# Patient Record
Sex: MALE | Race: WHITE | NOT HISPANIC OR LATINO | Employment: FULL TIME | URBAN - METROPOLITAN AREA
[De-identification: names, ages, dates, MRNs, and addresses within clinical notes are randomized per-mention and may not be internally consistent; named-entity substitution may affect disease eponyms.]

---

## 2017-08-28 ENCOUNTER — OFFICE VISIT (OUTPATIENT)
Dept: FAMILY MEDICINE | Facility: CLINIC | Age: 42
End: 2017-08-28
Payer: COMMERCIAL

## 2017-08-28 ENCOUNTER — RADIANT APPOINTMENT (OUTPATIENT)
Dept: GENERAL RADIOLOGY | Facility: CLINIC | Age: 42
End: 2017-08-28
Attending: PHYSICIAN ASSISTANT
Payer: COMMERCIAL

## 2017-08-28 VITALS
BODY MASS INDEX: 23.93 KG/M2 | HEART RATE: 58 BPM | WEIGHT: 146 LBS | DIASTOLIC BLOOD PRESSURE: 77 MMHG | SYSTOLIC BLOOD PRESSURE: 130 MMHG

## 2017-08-28 DIAGNOSIS — M25.512 CHRONIC LEFT SHOULDER PAIN: ICD-10-CM

## 2017-08-28 DIAGNOSIS — G89.29 CHRONIC LEFT SHOULDER PAIN: ICD-10-CM

## 2017-08-28 DIAGNOSIS — M25.512 CHRONIC LEFT SHOULDER PAIN: Primary | ICD-10-CM

## 2017-08-28 DIAGNOSIS — G89.29 CHRONIC LEFT SHOULDER PAIN: Primary | ICD-10-CM

## 2017-08-28 PROCEDURE — 73030 X-RAY EXAM OF SHOULDER: CPT | Mod: LT

## 2017-08-28 PROCEDURE — 99213 OFFICE O/P EST LOW 20 MIN: CPT | Performed by: PHYSICIAN ASSISTANT

## 2017-08-28 NOTE — PROGRESS NOTES
SUBJECTIVE:   Shamir Shea is a 42 year old male who presents to clinic today for the following health issues:    Chief Complaint   Patient presents with     Shoulder Pain     left shoulder pain x4 months. He recalls the pain started after throwing baseballs with his sons baseball team, Limited range of motion due to pain. no history of injury.      Left Shoulder Pain    Onset: 4 months    Description:   Location: outside of left shoulder  Character: Dull ache    Intensity: moderate    Accompanying Signs & Symptoms:  Other symptoms: pain when he lays on his side    History:   Previous similar pain: no       Precipitating factors:   Trauma or overuse: YES- throwing baseball    Alleviating factors:  Improved slightly by: rest/inactivity    Therapies Tried and outcome: tylenol ice and heat    Works in IT, typicng doesn't bother  Right handed  Sleeping/exercising hurts, arms over head. Adducting hurts  He was catching a baseball 4 months ago, remembers feeling something and it being sore. Caught up and off to the side  Has cut back on working out      Problem list and histories reviewed & adjusted, as indicated.  Additional history: as documented    Reviewed and updated as needed this visit by clinical staffTobacco  Allergies  Meds  Problems       Reviewed and updated as needed this visit by Provider  Allergies  Meds  Problems         ROS:  Other than noted above, general, HEENT, respiratory, cardiac, MS, and gastrointestinal systems are negative.     OBJECTIVE:     /77 (BP Location: Right arm, Patient Position: Chair, Cuff Size: Adult Regular)  Pulse 58  Wt 146 lb (66.2 kg)  BMI 23.93 kg/m2  Body mass index is 23.93 kg/(m^2).  GENERAL: healthy, alert and no distress  RESP: lungs clear to auscultation - no rales, rhonchi or wheezes  CV: regular rate and rhythm, normal S1 S2, no S3 or S4, no murmur, click or rub, no peripheral edema and peripheral pulses strong  ORTHO:   SHOULDER Exam-Left    Inspection: no swelling, no bruising, no discoloration, no obvious deformity, no asymmetry, no glenohumeral joint anterior bulge, no distal clavicle elevation, no muscle atrophy, no scapular winging   Tenderness of: Tender generalized joint space, lateral shoulder   Range of Motion: Active- limited forward flexion and abduction with pain, external rotation painful  Range of Motion: Passive- forward flexion- normal, abduction- normal, external rotation- normal, internal rotation- normal   Strength: forward flexion- weak, abduction- 5/5, internal rotation- 5/5, external rotation- 5/5 and bicep- full   Special tests: Neers- negative and Escalona(supraspinatous)- POSITIVE, lift off okay. External rotation painful        X-ray - IMPRESSION: Glenohumeral articulation appears to be intact on scapular Y radiograph. Distance between the distal clavicle and acromion appears increased at 8 mm, though clinically correlate with any possibility of AC joint injury with localized tenderness. No acute fracture visible.   I independently viewed the x-ray, discussed with patient, and am awaiting radiology read.     ASSESSMENT/PLAN:     ASSESSMENT/PLAN:      ICD-10-CM    1. Chronic left shoulder pain M25.512 XR Shoulder Left G/E 3 Views    G89.29 MR Shoulder Left w/o Contrast     He saw [facility] for other surgeries,he will wait on referral til MRI. He was frustrated with his knee with seeing physical therapy for a long time not helping til he found out he needed surgery.    Patient Instructions   MRI: For [place] imaging department: call [phone] to schedule   Possibly ortho or physical therapy after that    Ice your shoulder when sore.   Do hanging arm rolls - increase weight as you go  Stretch your shoulder by grabbing a broom handle with both hands spread apart. Elevate and stretch your sore shoulder by raising that arm with your other arm. Your sore arm is just hanging on to the handle and the handle does the stretching.  Walk your  arm up the wall to stretch it  Keep shoulder in one place and rotate elbow side to side    Kelsi Romero PA-C  [facility]

## 2017-08-28 NOTE — NURSING NOTE
"Chief Complaint   Patient presents with     Shoulder Pain     left shoulder pain x4 months. He recalls the pain started after throwing baseballs with his sons baseball team, Limited range of motion due to pain. no history of injury.        Initial /77 (BP Location: Right arm, Patient Position: Chair, Cuff Size: Adult Regular)  Pulse 58  Wt 146 lb (66.2 kg)  BMI 23.93 kg/m2 Estimated body mass index is 23.93 kg/(m^2) as calculated from the following:    Height as of 9/8/16: 5' 5.5\" (1.664 m).    Weight as of this encounter: 146 lb (66.2 kg).  Medication Reconciliation: complete   Екатерина Cox cMA    "

## 2017-08-28 NOTE — PATIENT INSTRUCTIONS
MRI: For [place] imaging department: call [phone] to schedule   Possibly ortho or physical therapy after that    Ice your shoulder when sore.   Do hanging arm rolls - increase weight as you go  Stretch your shoulder by grabbing a broom handle with both hands spread apart. Elevate and stretch your sore shoulder by raising that arm with your other arm. Your sore arm is just hanging on to the handle and the handle does the stretching.  Walk your arm up the wall to stretch it  Keep shoulder in one place and rotate elbow side to side

## 2017-08-28 NOTE — MR AVS SNAPSHOT
After Visit Summary   8/28/2017    Shamir Shea    MRN: [id number]           Patient Information     Date Of Birth          1975        Visit Information        Provider Department      8/28/2017 3:40 PM Kelsi Romero PA-C [facility]        Today's Diagnoses     Chronic left shoulder pain    -  1      Care Instructions    MRI: For [place] imaging department: call [phone] to schedule   Possibly ortho or physical therapy after that    Ice your shoulder when sore.   Do hanging arm rolls - increase weight as you go  Stretch your shoulder by grabbing a broom handle with both hands spread apart. Elevate and stretch your sore shoulder by raising that arm with your other arm. Your sore arm is just hanging on to the handle and the handle does the stretching.  Walk your arm up the wall to stretch it  Keep shoulder in one place and rotate elbow side to side              Follow-ups after your visit        Future tests that were ordered for you today     Open Future Orders        Priority Expected Expires Ordered    MR Shoulder Left w/o Contrast Routine  8/28/2018 8/28/2017            Who to contact     Normal or non-critical lab and imaging results will be communicated to you by Vitals ([email or web address])hart, letter or phone within 4 business days after the clinic has received the results. If you do not hear from us within 7 days, please contact the clinic through [facility] or phone. If you have a critical or abnormal lab result, we will notify you by phone as soon as possible.  Submit refill requests through QRcao or call your pharmacy and they will forward the refill request to us. Please allow 3 business days for your refill to be completed.          If you need to speak with a  for additional information , please call: [phone]             Additional Information About Your Visit        Vitals ([email or web address])harPrimÃ¢â‚¬â„¢Vision Information     QRcao gives you secure access to your electronic health record. If you see  a primary care provider, you can also send messages to your care team and make appointments. If you have questions, please call your primary care clinic.  If you do not have a primary care provider, please call [phone] and they will assist you.        Care EveryWhere ID     This is your Care EveryWhere ID. This could be used by other organizations to access your [place] medical records  [id number]        Your Vitals Were     Pulse BMI (Body Mass Index)                58 23.93 kg/m2           Blood Pressure from Last 3 Encounters:   08/28/17 130/77   09/08/16 128/84   11/14/11 119/67    Weight from Last 3 Encounters:   08/28/17 146 lb (66.2 kg)   09/08/16 148 lb (67.1 kg)   11/14/11 140 lb (63.5 kg)               Primary Care Provider Office Phone # Fax #    Humberto Schrader [phone]       [facility] PROGRAM [address] [address]        Equal Access to Services     LEVI MAHONEY AH: Hadii aad ku hadasho Soomaali, waaxda luqadaha, qaybta kaalmada adeegyada, waxay janetin haychristiano preciado . So [facility] [phone].    ATENCIÓN: Si habla español, tiene a cuadra disposición servicios gratuitos de asistencia lingüística. Llame al [phone].    We comply with applicable federal civil rights laws and [place] laws. We do not discriminate on the basis of race, color, national origin, age, disability sex, sexual orientation or gender identity.            Thank you!     Thank you for choosing [facility]  for your care. Our goal is always to provide you with excellent care. Hearing back from our patients is one way we can continue to improve our services. Please take a few minutes to complete the written survey that you may receive in the mail after your visit with us. Thank you!             Your Updated Medication List - Protect others around you: Learn how to safely use, store and throw away your medicines at www.disposemymeds.org.      Notice  As of 8/28/2017   4:26 PM    You have not been prescribed any medications.

## 2017-09-08 ENCOUNTER — HOSPITAL ENCOUNTER (OUTPATIENT)
Dept: MRI IMAGING | Facility: CLINIC | Age: 42
Discharge: HOME | End: 2017-09-08
Attending: PHYSICIAN ASSISTANT | Admitting: PHYSICIAN ASSISTANT
Payer: COMMERCIAL

## 2017-09-08 DIAGNOSIS — M25.512 CHRONIC LEFT SHOULDER PAIN: ICD-10-CM

## 2017-09-08 DIAGNOSIS — G89.29 CHRONIC LEFT SHOULDER PAIN: ICD-10-CM

## 2017-09-08 PROCEDURE — 73221 MRI JOINT UPR EXTREM W/O DYE: CPT | Mod: LT

## 2017-09-11 NOTE — ADDENDUM NOTE
Encounter addended by: Kelsi Romero PA-C on: 9/11/2017 11:58 AM<BR>     Actions taken:  activity accessed, Diagnosis association updated

## 2017-09-20 ENCOUNTER — OFFICE VISIT (OUTPATIENT)
Dept: ORTHOPEDICS | Facility: CLINIC | Age: 42
End: 2017-09-20
Payer: COMMERCIAL

## 2017-09-20 VITALS
SYSTOLIC BLOOD PRESSURE: 135 MMHG | WEIGHT: 147.9 LBS | HEART RATE: 65 BPM | BODY MASS INDEX: 23.77 KG/M2 | DIASTOLIC BLOOD PRESSURE: 91 MMHG | HEIGHT: 66 IN

## 2017-09-20 DIAGNOSIS — M75.82 TENDINITIS OF LEFT ROTATOR CUFF: Primary | ICD-10-CM

## 2017-09-20 PROCEDURE — 99203 OFFICE O/P NEW LOW 30 MIN: CPT | Performed by: PEDIATRICS

## 2017-09-20 NOTE — MR AVS SNAPSHOT
"              After Visit Summary   9/20/2017    Shamir Shea    MRN: [id number]           Patient Information     Date Of Birth          1975        Visit Information        Provider Department      9/20/2017 10:40 AM Jesenia Silver MD [facility] [place]        Today's Diagnoses     Tendinitis of left rotator cuff    -  1      Care Instructions    Today's Plan of Care:  Rehab: Physical Therapy: [facility] - [phone]    Follow Up: 6 weeks if symptoms fail to improve or worsen. Call with any questions or concerns.               Follow-ups after your visit        Additional Services     PHYSICAL THERAPY REFERRAL       *This therapy referral will be filtered to a centralized scheduling office at [facility] and the patient will receive a call to schedule an appointment at a [facility] location most convenient for them. *     [facility] provides Physical Therapy evaluation and treatment and many specialty services across the [facility] system.  If requesting a specialty program, please choose from the list below.    If you have not heard from the scheduling office within 2 business days, please call [phone] for all locations, with the exception of [place], please call [phone].  Treatment: Evaluation & Treatment  Special Instructions/Modalities: none  Special Programs: None    Please be aware that coverage of these services is subject to the terms and limitations of your health insurance plan.  Call member services at your health plan with any benefit or coverage questions.      **Note to Provider:  If you are referring outside of [facility] for the therapy appointment, please list the name of the location in the \"special instructions\" above, print the referral and give to the patient to schedule the appointment.                  Who to contact     If you have questions or need follow up information about today's clinic visit or " "your schedule please contact [facility] directly at [phone].  Normal or non-critical lab and imaging results will be communicated to you by MyChart, letter or phone within 4 business days after the clinic has received the results. If you do not hear from us within 7 days, please contact the clinic through [facility] [employer] or phone. If you have a critical or abnormal lab result, we will notify you by phone as soon as possible.  Submit refill requests through [employer]Share or call your pharmacy and they will forward the refill request to us. Please allow 3 business days for your refill to be completed.          Additional Information About Your Visit        Field [employer]ice Information     [employer] gives you secure access to your electronic health record. If you see a primary care provider, you can also send messages to your care team and make appointments. If you have questions, please call your primary care clinic.  If you do not have a primary care provider, please call [phone] and they will assist you.        Care EveryWhere ID     This is your Care EveryWhere ID. This could be used by other organizations to access your [place] medical records  [id number]        Your Vitals Were     Pulse Height BMI (Body Mass Index)             65 5' 5.5\" (1.664 m) 24.24 kg/m2          Blood Pressure from Last 3 Encounters:   09/20/17 (!) 135/91   08/28/17 130/77   09/08/16 128/84    Weight from Last 3 Encounters:   09/20/17 147 lb 14.4 oz (67.1 kg)   08/28/17 146 lb (66.2 kg)   09/08/16 148 lb (67.1 kg)              We Performed the Following     PHYSICAL THERAPY REFERRAL        Primary Care Provider Office Phone # Fax #    Humberto Schrader [phone]       [facility] PROGRAM [address] [address]        Equal Access to Services     LEVI MAHONEY AH: Ruth Araujo, waaxda luqadaha, qaybta kaalmada karlee, osmel cancino. So " [facility] [phone].    ATENCIÓN: Si habla kalia, tiene a cuadra disposición servicios gratuitos de asistencia lingüística. Filomena al [phone].    We comply with applicable federal civil rights laws and [place] laws. We do not discriminate on the basis of race, color, national origin, age, disability sex, sexual orientation or gender identity.            Thank you!     Thank you for choosing [facility]  for your care. Our goal is always to provide you with excellent care. Hearing back from our patients is one way we can continue to improve our services. Please take a few minutes to complete the written survey that you may receive in the mail after your visit with us. Thank you!             Your Updated Medication List - Protect others around you: Learn how to safely use, store and throw away your medicines at www.disposemymeds.org.      Notice  As of 9/20/2017 11:23 AM    You have not been prescribed any medications.

## 2017-09-20 NOTE — PROGRESS NOTES
Sports Medicine Clinic Visit    PCP: Humberto Schrader    Shamir Shea is a 42 year old male who is seen  in consultation at the request of Kelsi Romero PA-C presenting with chronic left shoulder pain.    Injury: Patient reports left shoulder pain for the past 4 months. Possibly 2 injuries that may have contributed. He reports it hurt after throwing his arm up to catch a baseball, and he may have fallen in his garage as well.  Still able to do most activities including weight lifting, reports most pain at the end of the night    Location of Pain: left shoulder lateral shoulder  Duration of Pain: ~ 4 months  Rating of Pain at worst: 7/10  Rating of Pain Currently: 3/10  Symptoms are better with: Ibuprofen, refraining from certain movements  Symptoms are worse with: turning over in bed, abducting his arm   Additional Features:   Positive: instability    Negative: swelling, bruising, popping, grinding, catching, locking, instability, paresthesias, numbness, weakness, pain in other joints and systemic symptoms  Other evaluation and/or treatments so far consists of: nothing  Prior History of related problems: dislocation ~18-20 years old, unsure of laterality, PT post injury    Social History: IT    Review of Systems  Skin: no bruising, no swelling  Musculoskeletal: as above  Neurologic: no numbness, paresthesias  Remainder of review of systems is negative including constitutional, CV, pulmonary, GI, except as noted in HPI or medical history.    Patient's current problem list, past medical and surgical history, and family history were reviewed.    Patient Active Problem List   Diagnosis     Hyperlipidemia     HYPERLIPIDEMIA LDL GOAL <160     Meniscus, medial, derangement     Achilles tendon tear     Post-proc states NEC     Achilles tendon pain     Past Medical History:   Diagnosis Date     PONV (postoperative nausea and vomiting)      Past Surgical History:   Procedure Laterality Date     ARTHROSCOPY KNEE WITH MEDIAL  "MENISCECTOMY  10/19/2011    Procedure:ARTHROSCOPY KNEE WITH MEDIAL MENISCECTOMY; Right Knee Arthroscopy with Medial Menisectomy--Anes Choice; Surgeon:SUMI BEAN; Location:[place] OR     HERNIORRHAPHY HIATAL  1991     REPAIR TENDON ACHILLES  11/14/2011    Procedure:REPAIR TENDON ACHILLES; Left ankle achilles tendon rupure repair-popliteal block; Surgeon:KATINA STEIN; Location:[place] OR     Family History   Problem Relation Age of Onset     C.A.D. Maternal Grandfather      Circulatory Mother      dvt     Blood Disease Mother      Factor five      Hypertension Brother      Lipids Brother          Objective  BP (!) 135/91  Pulse 65  Ht 5' 5.5\" (1.664 m)  Wt 147 lb 14.4 oz (67.1 kg)  BMI 24.24 kg/m2    GENERAL APPEARANCE: healthy, alert and no distress   GAIT: NORMAL  SKIN: no suspicious lesions or rashes  HEENT: Sclera clear, anicteric  CV: good peripheral pulses  RESP: Breathing not labored  NEURO: Normal strength and tone, mentation intact and speech normal  PSYCH:  mentation appears normal and affect normal/bright    Bilateral Shoulder exam    Inspection and Posture:       normal    Skin:        no visible deformities    Tender:        none    Non Tender:       remainder of shoulder bilateral    ROM:        Full active and passive ROM with flexion, extension, abduction, internal and external rotation bilateral       asymmetric scapular motion    Painful motions:       end range flexion and elevation left    Strength:        abduction 5/5 bilateral       flexion 5/5 bilateral       internal rotation 5/5 bilateral       external rotation 5/5 bilateral    Impingement testing:       positive (+) Neer left       neg (-) Escalona left       positive (+) O'bladimir left    Sensation:        normal sensation over shoulder and upper extremity     Radiology  I visualized and reviewed these images with the patient  LEFT SHOULDER THREE OR MORE VIEWS  8/28/2017 4:26 PM   HISTORY: Outer shoulder pain after injury 4 months. " Pain in left  shoulder. Other chronic pain.  COMPARISON: None.  IMPRESSION: Glenohumeral articulation appears to be intact on scapular  Y radiograph. Distance between the distal clavicle and acromion  appears increased at 8 mm, though clinically correlate with any  possibility of AC joint injury with localized tenderness. No acute  fracture visible.     MR LEFT SHOULDER 9/8/2017 12:50 PM  HISTORY: Left shoulder pain and weakness since May 2017. No specific  injury but patient plays baseball.  TECHNIQUE: Oblique coronal T1, T2 and T2 fat suppressed images,  oblique sagittal T2 and axial proton density and T2 weighted images  were obtained.   COMPARISON: None.  FINDINGS:  Osseous acromial outlet: No significant hypertrophic changes at the  acromioclavicular joint. Distal acromial morphology is type I with  negative slope on oblique sagittal images and mild lateral downsloping  on oblique coronal images. There is no abnormal fluid in the  subacromial/subdeltoid bursa, although minimal bursal edema may be  present.  Rotator cuff: There is mild signal heterogeneity in the lateral  supraspinatus tendon consistent with degenerative tendinopathy. A tiny  focus of intrasubstance tear may be present at the distal footplate  insertion (image 9 of series 8). No evidence for significant partial  thickness tear or full-thickness tear/tendon retraction and the  supraspinous muscle shows no atrophy. The infraspinatus, teres minor  and subscapularis muscles and tendons are unremarkable.  Labrum: The superior and anterior labrum appear normal. The posterior  labrum shows no definite tear, but there is a tiny adjacent para  labral cyst at the mid posterior labrum measuring 3 x 1 x 1 mm (image  16 of series 4, image 16 of series 8 and image 16 of series 5). This  type of cyst is often associated with labral pathology. If posterior  labral pathology needs to be confirmed or excluded, an MR arthrogram  would be more definitive.  Biceps  tendon: The biceps tendon is normal proximally at the biceps  anchor and distally in the bicipital groove.    Osseous structures and cartilaginous surfaces: No acute bony  abnormalities. Articular cartilages of the glenohumeral joint are  normal.  Additional findings: None.  IMPRESSION:   1. Mild supraspinatus degenerative tendinopathy with question of a  single very tiny focus of intrasubstance tearing.  2. Tiny posterior para labral cyst could be a marker for posterior  labral pathology, although no definite posterior labral tear is seen.  If clinically indicated, MR arthrogram would be more definitive.    Assessment:  1. Tendinitis of left rotator cuff      Chronic right shoulder pain with evidence of rotator cuff tendinopathy and partial tearing on MRI. Exam with evidence of rotator cuff dysfunction. I recommend physical therapy for rotator cuff strengthening coupled with resting from ear to activities. Would consider further workup and treatment pending clinical course.    Plan:  Today's Plan of Care:  Rehab: [facility]: [facility] - [phone]    Follow Up: 6 weeks if symptoms fail to improve or worsen. Call with any questions or concerns.     Concerning signs and symptoms were reviewed.  The patient expressed understanding of this management plan and all questions were answered at this time.    Thanks for the opportunity to participate in the care of this patient, I will keep you updated on their progress.    CC: Kelsi Silver MD CA  Primary Care Sports Medicine  [facility]

## 2017-09-20 NOTE — NURSING NOTE
"Chief Complaint   Patient presents with     Shoulder left       Initial Ht 5' 5.5\" (1.664 m)  Wt 147 lb 14.4 oz (67.1 kg)  BMI 24.24 kg/m2 Estimated body mass index is 24.24 kg/(m^2) as calculated from the following:    Height as of this encounter: 5' 5.5\" (1.664 m).    Weight as of this encounter: 147 lb 14.4 oz (67.1 kg).  Medication Reconciliation: complete     Lizbeth Mccallum M.Ed., ATR, ATC      "

## 2017-09-20 NOTE — PATIENT INSTRUCTIONS
Today's Plan of Care:  Rehab: Physical Therapy: [facility] - [phone]    Follow Up: 6 weeks if symptoms fail to improve or worsen. Call with any questions or concerns.

## 2018-02-23 ENCOUNTER — OFFICE VISIT (OUTPATIENT)
Dept: FAMILY MEDICINE | Facility: CLINIC | Age: 43
End: 2018-02-23
Payer: COMMERCIAL

## 2018-02-23 VITALS
HEART RATE: 76 BPM | HEIGHT: 66 IN | DIASTOLIC BLOOD PRESSURE: 86 MMHG | BODY MASS INDEX: 23.82 KG/M2 | WEIGHT: 148.2 LBS | SYSTOLIC BLOOD PRESSURE: 136 MMHG | TEMPERATURE: 97.4 F

## 2018-02-23 DIAGNOSIS — D22.39 MELANOCYTIC NEVUS OF FACE, OTHER LOCATION: Primary | ICD-10-CM

## 2018-02-23 DIAGNOSIS — Z13.1 SCREENING FOR DIABETES MELLITUS: ICD-10-CM

## 2018-02-23 DIAGNOSIS — D22.9 CHANGE IN MOLE: ICD-10-CM

## 2018-02-23 DIAGNOSIS — E78.5 HYPERLIPIDEMIA, UNSPECIFIED HYPERLIPIDEMIA TYPE: ICD-10-CM

## 2018-02-23 PROCEDURE — 99213 OFFICE O/P EST LOW 20 MIN: CPT | Performed by: FAMILY MEDICINE

## 2018-02-23 NOTE — MR AVS SNAPSHOT
After Visit Summary   2/23/2018    Shamir Shea    MRN: [id number]           Patient Information     Date Of Birth          1975        Visit Information        Provider Department      2/23/2018 11:00 AM Dawna Anaya MD [facility]        Today's Diagnoses     Melanocytic nevus of face, other location    -  1    Change in mole        Hyperlipidemia, unspecified hyperlipidemia type        Screening for diabetes mellitus           Follow-ups after your visit        Future tests that were ordered for you today     Open Future Orders        Priority Expected Expires Ordered    Lipid panel reflex to direct LDL Fasting Routine  2/23/2019 2/23/2018    Glucose Routine  2/23/2019 2/23/2018            Who to contact     Normal or non-critical lab and imaging results will be communicated to you by entegra technologieshart, letter or phone within 4 business days after the clinic has received the results. If you do not hear from us within 7 days, please contact the clinic through entegra technologieshart or phone. If you have a critical or abnormal lab result, we will notify you by phone as soon as possible.  Submit refill requests through "[employer] or call your pharmacy and they will forward the refill request to us. Please allow 3 business days for your refill to be completed.          If you need to speak with a  for additional information , please call: [phone]             Additional Information About Your Visit        entegra technologiesharBoyibang Information     "[employer] gives you secure access to your electronic health record. If you see a primary care provider, you can also send messages to your care team and make appointments. If you have questions, please call your primary care clinic.  If you do not have a primary care provider, please call [phone] and they will assist you.        Care EveryWhere ID     This is your Care EveryWhere ID. This could be used by other organizations to access your [facility]  "records  [id number]        Your Vitals Were     Pulse Temperature Height BMI (Body Mass Index)          76 97.4  F (36.3  C) (Tympanic) 5' 5.5\" (1.664 m) 24.29 kg/m2         Blood Pressure from Last 3 Encounters:   02/23/18 136/86   09/20/17 (!) 135/91   08/28/17 130/77    Weight from Last 3 Encounters:   02/23/18 148 lb 3.2 oz (67.2 kg)   09/20/17 147 lb 14.4 oz (67.1 kg)   08/28/17 146 lb (66.2 kg)               Primary Care Provider Office Phone # Fax #    Humberto Schrader [phone]       [facility] PROGRAM [address] [facility] [address]        Equal Access to Services     LEVI MAHONEY : Hadii alexa rogers hadasho Soomaali, waaxda luqadaha, qaybta kaalmada adeegyada, osmel preciado . So [facility] [phone].    ATENCIÓN: Si habla español, tiene a cuadra disposición servicios gratuitos de asistencia lingüística. Llame al [phone].    We comply with applicable federal civil rights laws and [place] laws. We do not discriminate on the basis of race, color, national origin, age, disability, sex, sexual orientation, or gender identity.            Thank you!     Thank you for choosing [facility]  for your care. Our goal is always to provide you with excellent care. Hearing back from our patients is one way we can continue to improve our services. Please take a few minutes to complete the written survey that you may receive in the mail after your visit with us. Thank you!             Your Updated Medication List - Protect others around you: Learn how to safely use, store and throw away your medicines at www.disposemymeds.org.      Notice  As of 2/23/2018 12:20 PM    You have not been prescribed any medications.      "

## 2018-02-23 NOTE — LETTER
[facility]  [address]  [facility] [address]  [phone]        February 28, 2019    Shamir Shea  [address] [facility] [address]              Dear Shamir Shea    This is to remind you that your fasting lab is due.    You may call our office at [phone] to schedule an appointment.    Please disregard this notice if you have already had your labs drawn or made an appointment.        Sincerely,        Dawna Anaya MD

## 2018-02-23 NOTE — PROGRESS NOTES
"  SUBJECTIVE:   Shamir Shea is a 42 year old male who presents to clinic today for the following health issues:      Mole on right side of face    - seems to be larger.   No itch/bleed     2 moles on the left abdominal area   - new or darker   No itch/bleed     No personal history of skin cancer     Doesn't come to the doctor very often.  Last lipids 2010 - elevated   Not fasting today     Family history: no skin ca     Review of systems:  No malaise or fatigue  Normal appetite  No night sweats      Problem list and histories reviewed & adjusted, as indicated.  Additional history: as documented    Patient Active Problem List   Diagnosis     Hyperlipidemia     HYPERLIPIDEMIA LDL GOAL <160     Meniscus, medial, derangement     Achilles tendon tear     Other postprocedural status(V45.89)     Achilles tendon pain     No current outpatient prescriptions on file.     No current facility-administered medications for this visit.         Allergies   Allergen Reactions     Nkda [No Known Drug Allergies]      /86 (BP Location: Right arm, Patient Position: Sitting, Cuff Size: Adult Regular)  Pulse 76  Temp 97.4  F (36.3  C) (Tympanic)  Ht 5' 5.5\" (1.664 m)  Wt 148 lb 3.2 oz (67.2 kg)  BMI 24.29 kg/m2   Pt is alert and oriented in no acute distress.  Affect is appropriate. Good eye contact.  Right lower cheek greyish maculopapular lesion, homogenous, measures 4mm in diameter   Anterior abdomen - two lesions that are similar in appearance - maculopapular pinkish brown , approximately 5mm in diameter each.      Assessment/Plan -    (D22.39) Melanocytic nevus of face, other location  (primary encounter diagnosis)  Comment: This mole appears benign. No concerning findings. If he wants definitive diagnosis, needs biopsy. The patient indicates understanding of these issues and agrees with the plan.   Plan:     (L81.9) Change in mole  Comment: because these are new and pinkish brown, I would recommend biopsy. He will make " a f/u appointment   Plan:     (E78.5) Hyperlipidemia, unspecified hyperlipidemia type  Comment:   Plan: Lipid panel reflex to direct LDL Fasting            (Z13.1) Screening for diabetes mellitus  Comment:   Plan: Glucose            C. Aixa Anaya MD

## 2018-02-23 NOTE — NURSING NOTE
"Chief Complaint   Patient presents with     Mole       Initial /86 (BP Location: Right arm, Patient Position: Sitting, Cuff Size: Adult Regular)  Pulse 76  Temp 97.4  F (36.3  C) (Tympanic)  Ht 5' 5.5\" (1.664 m)  Wt 148 lb 3.2 oz (67.2 kg)  BMI 24.29 kg/m2 Estimated body mass index is 24.29 kg/(m^2) as calculated from the following:    Height as of this encounter: 5' 5.5\" (1.664 m).    Weight as of this encounter: 148 lb 3.2 oz (67.2 kg).  Medication Reconciliation: complete   Madison Farris LPN    "

## 2018-03-15 ENCOUNTER — OFFICE VISIT (OUTPATIENT)
Dept: URGENT CARE | Facility: URGENT CARE | Age: 43
End: 2018-03-15
Payer: COMMERCIAL

## 2018-03-15 VITALS
HEART RATE: 72 BPM | SYSTOLIC BLOOD PRESSURE: 110 MMHG | RESPIRATION RATE: 20 BRPM | WEIGHT: 149.7 LBS | DIASTOLIC BLOOD PRESSURE: 72 MMHG | TEMPERATURE: 97.9 F | BODY MASS INDEX: 24.53 KG/M2

## 2018-03-15 DIAGNOSIS — S49.92XA SHOULDER INJURY, LEFT, INITIAL ENCOUNTER: Primary | ICD-10-CM

## 2018-03-15 PROCEDURE — 99214 OFFICE O/P EST MOD 30 MIN: CPT | Performed by: PHYSICIAN ASSISTANT

## 2018-03-15 RX ORDER — IBUPROFEN 800 MG/1
800 TABLET, FILM COATED ORAL EVERY 8 HOURS PRN
Qty: 30 TABLET | Refills: 1 | Status: SHIPPED | OUTPATIENT
Start: 2018-03-15 | End: 2019-07-24

## 2018-03-15 RX ORDER — HYDROCODONE BITARTRATE AND ACETAMINOPHEN 5; 325 MG/1; MG/1
1 TABLET ORAL EVERY 6 HOURS PRN
Qty: 15 TABLET | Refills: 0 | Status: SHIPPED | OUTPATIENT
Start: 2018-03-15 | End: 2019-07-24

## 2018-03-15 NOTE — PATIENT INSTRUCTIONS
(S49.92XA) Shoulder injury, left, initial encounter  (primary encounter diagnosis)  Comment:   Plan: ibuprofen (ADVIL/MOTRIN) 800 MG tablet,         HYDROcodone-acetaminophen (NORCO) 5-325 MG per         tablet            Ice shoulder over thin cloth as directed.      Follow up with Orthopedics.

## 2018-03-15 NOTE — MR AVS SNAPSHOT
After Visit Summary   3/15/2018    Shamir Shea    MRN: [id number]           Patient Information     Date Of Birth          1975        Visit Information        Provider Department      3/15/2018 4:05 PM Larisa Hoffman PA-C [facility]        Today's Diagnoses     Shoulder injury, left, initial encounter    -  1      Care Instructions    (S49.92XA) Shoulder injury, left, initial encounter  (primary encounter diagnosis)  Comment:   Plan: ibuprofen (ADVIL/MOTRIN) 800 MG tablet,         HYDROcodone-acetaminophen (NORCO) 5-325 MG per         tablet            Ice shoulder over thin cloth as directed.      Follow up with Orthopedics.              Follow-ups after your visit        Who to contact     If you have questions or need follow up information about today's clinic visit or your schedule please contact [facility] directly at [phone].  Normal or non-critical lab and imaging results will be communicated to you by Power2[employer]hart, letter or phone within 4 business days after the clinic has received the results. If you do not hear from us within 7 days, please contact the clinic through [employer] or phone. If you have a critical or abnormal lab result, we will notify you by phone as soon as possible.  Submit refill requests through [employer] or call your pharmacy and they will forward the refill request to us. Please allow 3 business days for your refill to be completed.          Additional Information About Your Visit        MyChart Information     [employer] gives you secure access to your electronic health record. If you see a primary care provider, you can also send messages to your care team and make appointments. If you have questions, please call your primary care clinic.  If you do not have a primary care provider, please call [phone] and they will assist you.        Care EveryWhere ID     This is your Care EveryWhere ID. This  could be used by other organizations to access your [facility] medical records  [id number]        Your Vitals Were     Pulse Temperature Respirations BMI (Body Mass Index)          72 97.9  F (36.6  C) (Oral) 20 24.53 kg/m2         Blood Pressure from Last 3 Encounters:   03/15/18 110/72   02/23/18 136/86   09/20/17 (!) 135/91    Weight from Last 3 Encounters:   03/15/18 149 lb 11.2 oz (67.9 kg)   02/23/18 148 lb 3.2 oz (67.2 kg)   09/20/17 147 lb 14.4 oz (67.1 kg)              Today, you had the following     No orders found for display         Today's Medication Changes          These changes are accurate as of 3/15/18  5:56 PM.  If you have any questions, ask your nurse or doctor.               Start taking these medicines.        Dose/Directions    HYDROcodone-acetaminophen 5-325 MG per tablet   Commonly known as:  NORCO   Used for:  Shoulder injury, left, initial encounter   Started by:  Larisa Hoffman PA-C        Dose:  1 tablet   Take 1 tablet by mouth every 6 hours as needed   Quantity:  15 tablet   Refills:  0       ibuprofen 800 MG tablet   Commonly known as:  ADVIL/MOTRIN   Used for:  Shoulder injury, left, initial encounter   Started by:  Larisa Hoffman PA-C        Dose:  800 mg   Take 1 tablet (800 mg) by mouth every 8 hours as needed for moderate pain   Quantity:  30 tablet   Refills:  1            Where to get your medicines      These medications were sent to [facility] [address] [address]     Phone:  [phone]     ibuprofen 800 MG tablet         Some of these will need a paper prescription and others can be bought over the counter.  Ask your nurse if you have questions.     Bring a paper prescription for each of these medications     HYDROcodone-acetaminophen 5-325 MG per tablet                Primary Care Provider Office Phone # Fax #    Humberto Schrader [phone]       [facility] [address] [facility] [address]        Equal Access to Services     LEVI MAHONEY : Hadii aad ku hadjusticetoya Solupis, waglennda luqadaha, qatiffanyta kabelenpayton desir, osmel jenkinslylynicolás cancino. So [facility] [phone].    ATENCIÓN: Si habla español, tiene a cuadra disposición servicios gratuitos de asistencia lingüística. Llame al [phone].    We comply with applicable federal civil rights laws and [place] laws. We do not discriminate on the basis of race, color, national origin, age, disability, sex, sexual orientation, or gender identity.            Thank you!     Thank you for choosing [facility]  for your care. Our goal is always to provide you with excellent care. Hearing back from our patients is one way we can continue to improve our services. Please take a few minutes to complete the written survey that you may receive in the mail after your visit with us. Thank you!             Your Updated Medication List - Protect others around you: Learn how to safely use, store and throw away your medicines at www.disposemymeds.org.          This list is accurate as of 3/15/18  5:56 PM.  Always use your most recent med list.                   Brand Name Dispense Instructions for use Diagnosis    HYDROcodone-acetaminophen 5-325 MG per tablet    NORCO    15 tablet    Take 1 tablet by mouth every 6 hours as needed    Shoulder injury, left, initial encounter       ibuprofen 800 MG tablet    ADVIL/MOTRIN    30 tablet    Take 1 tablet (800 mg) by mouth every 8 hours as needed for moderate pain    Shoulder injury, left, initial encounter

## 2018-03-16 ENCOUNTER — HOSPITAL ENCOUNTER (OUTPATIENT)
Dept: MRI IMAGING | Facility: CLINIC | Age: 43
Discharge: HOME | End: 2018-03-16
Attending: ORTHOPAEDIC SURGERY | Admitting: ORTHOPAEDIC SURGERY
Payer: COMMERCIAL

## 2018-03-16 DIAGNOSIS — M25.519 SHOULDER PAIN: ICD-10-CM

## 2018-03-16 PROCEDURE — 73221 MRI JOINT UPR EXTREM W/O DYE: CPT | Mod: LT

## 2018-03-16 NOTE — PROGRESS NOTES
SUBJECTIVE:  Chief Complaint   Patient presents with     Shoulder Injury     Left shoulder injury , pain x today about 1 hour ago      Shamir Shea is a 42 year old male presents with a chief complaint of left shoulder pain since injuring it while climbing just prior to arrival in clinic today.   He was indoor rock climbing.    [place] his shoulder pop in and out of joint while trying to pull himself up.  Excruciating pain.    Has had pain and limited motion of shoulder since injury.      SH: here with his wife and two children who were climbing as well.      Past Medical History:   Diagnosis Date     PONV (postoperative nausea and vomiting)      Patient Active Problem List   Diagnosis     Hyperlipidemia     HYPERLIPIDEMIA LDL GOAL <160     Meniscus, medial, derangement     Achilles tendon tear     Other postprocedural status(V45.89)     Achilles tendon pain     Social History   Substance Use Topics     Smoking status: Never Smoker     Smokeless tobacco: Never Used     Alcohol use Yes      Comment: Monthly       ROS:  CONSTITUTIONAL:NEGATIVE for fever, chills, change in weight  INTEGUMENTARY/SKIN: NEGATIVE for worrisome rashes, moles or lesions  MUSCULOSKELETAL: as per HPI  NEURO: NEGATIVE for weakness, dizziness or paresthesias  Review of systems negative except as stated above.    EXAM:   /72  Pulse 72  Temp 97.9  F (36.6  C) (Oral)  Resp 20  Wt 149 lb 11.2 oz (67.9 kg)  BMI 24.53 kg/m2  Gen: healthy,alert, mild distress secondary to pain.   Extremity: left shoulder: pain with internal rotation.    Passive aBduction wnl.    No clicking or crepitus, however patient was apprehensive.    No bony tenderness.     There is not compromise to the distal circulation.  Pulses are +2 and CRT is brisk  SKIN: no suspicious lesions or rashes  NEURO: Normal strength and tone, sensory exam grossly normal, mentation intact and speech normal    X-RAY was not done.    S49.92XA) Shoulder injury, left, initial encounter   (primary encounter diagnosis)  Comment:   Plan: ibuprofen (ADVIL/MOTRIN) 800 MG tablet,         HYDROcodone-acetaminophen (NORCO) 5-325 MG per         tablet            Ice shoulder over thin cloth as directed.      Follow up with Orthopedics.      Patient expresses understanding and agreement with the assessment and plan as above.

## 2018-03-29 ENCOUNTER — TRANSFERRED RECORDS (OUTPATIENT)
Dept: HEALTH INFORMATION MANAGEMENT | Facility: CLINIC | Age: 43
End: 2018-03-29

## 2018-04-04 ENCOUNTER — OFFICE VISIT (OUTPATIENT)
Dept: FAMILY MEDICINE | Facility: CLINIC | Age: 43
End: 2018-04-04
Payer: COMMERCIAL

## 2018-04-04 VITALS
HEIGHT: 66 IN | BODY MASS INDEX: 24.01 KG/M2 | HEART RATE: 73 BPM | WEIGHT: 149.4 LBS | DIASTOLIC BLOOD PRESSURE: 84 MMHG | TEMPERATURE: 98.2 F | SYSTOLIC BLOOD PRESSURE: 127 MMHG

## 2018-04-04 DIAGNOSIS — E78.5 HYPERLIPIDEMIA, UNSPECIFIED HYPERLIPIDEMIA TYPE: ICD-10-CM

## 2018-04-04 DIAGNOSIS — S46.219A BICEPS TENDON TEAR: ICD-10-CM

## 2018-04-04 DIAGNOSIS — Z01.818 PREOP GENERAL PHYSICAL EXAM: Primary | ICD-10-CM

## 2018-04-04 LAB
BASOPHILS # BLD AUTO: 0 10E9/L (ref 0–0.2)
BASOPHILS NFR BLD AUTO: 0.4 %
DIFFERENTIAL METHOD BLD: NORMAL
EOSINOPHIL # BLD AUTO: 0.3 10E9/L (ref 0–0.7)
EOSINOPHIL NFR BLD AUTO: 3.6 %
ERYTHROCYTE [DISTWIDTH] IN BLOOD BY AUTOMATED COUNT: 13.1 % (ref 10–15)
HCT VFR BLD AUTO: 46 % (ref 40–53)
HGB BLD-MCNC: 15 G/DL (ref 13.3–17.7)
LYMPHOCYTES # BLD AUTO: 2.4 10E9/L (ref 0.8–5.3)
LYMPHOCYTES NFR BLD AUTO: 30.6 %
MCH RBC QN AUTO: 27.5 PG (ref 26.5–33)
MCHC RBC AUTO-ENTMCNC: 32.6 G/DL (ref 31.5–36.5)
MCV RBC AUTO: 84 FL (ref 78–100)
MONOCYTES # BLD AUTO: 0.8 10E9/L (ref 0–1.3)
MONOCYTES NFR BLD AUTO: 10.2 %
NEUTROPHILS # BLD AUTO: 4.3 10E9/L (ref 1.6–8.3)
NEUTROPHILS NFR BLD AUTO: 55.2 %
PLATELET # BLD AUTO: 270 10E9/L (ref 150–450)
RBC # BLD AUTO: 5.45 10E12/L (ref 4.4–5.9)
WBC # BLD AUTO: 7.8 10E9/L (ref 4–11)

## 2018-04-04 PROCEDURE — 85025 COMPLETE CBC W/AUTO DIFF WBC: CPT | Performed by: PHYSICIAN ASSISTANT

## 2018-04-04 PROCEDURE — 99214 OFFICE O/P EST MOD 30 MIN: CPT | Performed by: PHYSICIAN ASSISTANT

## 2018-04-04 PROCEDURE — 36415 COLL VENOUS BLD VENIPUNCTURE: CPT | Performed by: PHYSICIAN ASSISTANT

## 2018-04-04 NOTE — LETTER
April 13, 2018      Shamir Shea  [address] [address] [address]        Dear Shamir,    We are writing to inform you of your test results.    Gómez Barksdale,   Your labs are okay.   Kelsi Romero PA-C     Resulted Orders   CBC with platelets differential   Result Value Ref Range    WBC 7.8 4.0 - 11.0 10e9/L    RBC Count 5.45 4.4 - 5.9 10e12/L    Hemoglobin 15.0 13.3 - 17.7 g/dL    Hematocrit 46.0 40.0 - 53.0 %    MCV 84 78 - 100 fl    MCH 27.5 26.5 - 33.0 pg    MCHC 32.6 31.5 - 36.5 g/dL    RDW 13.1 10.0 - 15.0 %    Platelet Count 270 150 - 450 10e9/L    Diff Method Automated Method     % Neutrophils 55.2 %    % Lymphocytes 30.6 %    % Monocytes 10.2 %    % Eosinophils 3.6 %    % Basophils 0.4 %    Absolute Neutrophil 4.3 1.6 - 8.3 10e9/L    Absolute Lymphocytes 2.4 0.8 - 5.3 10e9/L    Absolute Monocytes 0.8 0.0 - 1.3 10e9/L    Absolute Eosinophils 0.3 0.0 - 0.7 10e9/L    Absolute Basophils 0.0 0.0 - 0.2 10e9/L       If you have any questions or concerns, please call the clinic at the number listed above.       Sincerely,        Kelsi Romero PA-C

## 2018-04-04 NOTE — MR AVS SNAPSHOT
After Visit Summary   4/4/2018    Shamir Shea    MRN: [id number]           Patient Information     Date Of Birth          1975        Visit Information        Provider Department      4/4/2018 9:20 AM Kelsi Romero PA-C [facility]        Today's Diagnoses     Preop general physical exam    -  1    Biceps tendon tear        Hyperlipidemia, unspecified hyperlipidemia type          Care Instructions        You are due for lab tests  - future lab order is already placed in computer system  Fasting labs: no food or drink (except water) for 10-12 hours before labs, make lab appointment   [facility] Lab Hours  Mon 7:45 am - 6:30 pm   Tues-Fri 7:45 am - 4:45 pm   Ph: [phone] - to schedule    Before Your Surgery      Call your surgeon if there is any change in your health. This includes signs of a cold or flu (such as a sore throat, runny nose, cough, rash or fever).    Do not smoke, drink alcohol or take over the counter medicine (unless your surgeon or primary care doctor tells you to) for the 24 hours before and after surgery.    If you take prescribed drugs: Follow your doctor s orders about which medicines to take and which to stop until after surgery.    Eating and drinking prior to surgery: follow the instructions from your surgeon    Take a shower or bath the night before surgery. Use the soap your surgeon gave you to gently clean your skin. If you do not have soap from your surgeon, use your regular soap. Do not shave or scrub the surgery site.  Wear clean pajamas and have clean sheets on your bed.           Follow-ups after your visit        Who to contact     Normal or non-critical lab and imaging results will be communicated to you by MyChart, letter or phone within 4 business days after the clinic has received the results. If you do not hear from us within 7 days, please contact the clinic through MyChart or phone. If you have a critical or abnormal lab result, we will  "notify you by phone as soon as possible.  Submit refill requests through [employer] or call your pharmacy and they will forward the refill request to us. Please allow 3 business days for your refill to be completed.          If you need to speak with a  for additional information , please call: [phone]             Additional Information About Your Visit        [employer] Information     [employer] gives you secure access to your electronic health record. If you see a primary care provider, you can also send messages to your care team and make appointments. If you have questions, please call your primary care clinic.  If you do not have a primary care provider, please call [phone] and they will assist you.        Care EveryWhere ID     This is your Care EveryWhere ID. This could be used by other organizations to access your [facility] medical records  [id number]        Your Vitals Were     Pulse Temperature Height BMI (Body Mass Index)          73 98.2  F (36.8  C) (Tympanic) 5' 5.5\" (1.664 m) 24.48 kg/m2         Blood Pressure from Last 3 Encounters:   04/04/18 127/84   03/15/18 110/72   02/23/18 136/86    Weight from Last 3 Encounters:   04/04/18 149 lb 6.4 oz (67.8 kg)   03/15/18 149 lb 11.2 oz (67.9 kg)   02/23/18 148 lb 3.2 oz (67.2 kg)              We Performed the Following     CBC with platelets differential        Primary Care Provider Office Phone # Fax #    Humberto Schrader [phone]       [facility] PRO[facility] [address] [facility] [address]        Equal Access to Services     [facility] : Hadii alexa rogers hadasho Solupis, waaxda luqadaha, qaybta kaalmada osmel desir. So [facility] [phone].    ATENCIÓN: Si habla español, tiene a cuadra disposición servicios gratuitos de asistencia lingüística. Llame al [phone].    We comply with applicable federal civil rights laws and [place] laws. We do not discriminate on the basis " of race, color, national origin, age, disability, sex, sexual orientation, or gender identity.            Thank you!     Thank you for choosing [facility]  for your care. Our goal is always to provide you with excellent care. Hearing back from our patients is one way we can continue to improve our services. Please take a few minutes to complete the written survey that you may receive in the mail after your visit with us. Thank you!             Your Updated Medication List - Protect others around you: Learn how to safely use, store and throw away your medicines at www.disposemymeds.org.          This list is accurate as of 4/4/18  9:59 AM.  Always use your most recent med list.                   Brand Name Dispense Instructions for use Diagnosis    HYDROcodone-acetaminophen 5-325 MG per tablet    NORCO    15 tablet    Take 1 tablet by mouth every 6 hours as needed    Shoulder injury, left, initial encounter       ibuprofen 800 MG tablet    ADVIL/MOTRIN    30 tablet    Take 1 tablet (800 mg) by mouth every 8 hours as needed for moderate pain    Shoulder injury, left, initial encounter

## 2018-04-04 NOTE — PATIENT INSTRUCTIONS
You are due for lab tests  - future lab order is already placed in computer system  Fasting labs: no food or drink (except water) for 10-12 hours before labs, make lab appointment   [facility] Lab Hours  Mon 7:45 am - 6:30 pm   Tues-Fri 7:45 am - 4:45 pm   Ph: [phone] - to schedule    Before Your Surgery      Call your surgeon if there is any change in your health. This includes signs of a cold or flu (such as a sore throat, runny nose, cough, rash or fever).    Do not smoke, drink alcohol or take over the counter medicine (unless your surgeon or primary care doctor tells you to) for the 24 hours before and after surgery.    If you take prescribed drugs: Follow your doctor s orders about which medicines to take and which to stop until after surgery.    Eating and drinking prior to surgery: follow the instructions from your surgeon    Take a shower or bath the night before surgery. Use the soap your surgeon gave you to gently clean your skin. If you do not have soap from your surgeon, use your regular soap. Do not shave or scrub the surgery site.  Wear clean pajamas and have clean sheets on your bed.

## 2018-04-04 NOTE — PROGRESS NOTES
[facility]  [address] [address]  [phone]  Dept: [phone]    PRE-OP EVALUATION:  Today's date: 2018    Shamir Shea (: 1975) presents for pre-operative evaluation assessment as requested by Dr. Edouard.  He requires evaluation and anesthesia risk assessment prior to undergoing surgery/procedure for treatment of left bicep tendon tear.    Proposed Surgery/ Procedure: Bicep Tenodesis  Date of Surgery/ Procedure: 2018  Time of Surgery/ Procedure: 2:30pm  Hospital/Surgical Facility: [facility]  Fax number for surgical facility: [phone]  Primary Physician: Humberto Schrader  Type of Anesthesia Anticipated: General    Patient has a Health Care Directive or Living Will:  NO    1. NO - Do you have a history of heart attack, stroke, stent, bypass or surgery on an artery in the head, neck, heart or legs?  2. NO - Do you ever have any pain or discomfort in your chest?  3. NO - Do you have a history of  Heart Failure?  4. NO - Are you troubled by shortness of breath when: walking on the level, up a slight hill or at night?  5. NO - Do you currently have a cold, bronchitis or other respiratory infection?  6. NO - Do you have a cough, shortness of breath or wheezing?  7. NO - Do you sometimes get pains in the calves of your legs when you walk?  8. YES - mother had DVT/Factor V- Do you or anyone in your family have previous history of blood clots?  9. NO - Do you or does anyone in your family have a serious bleeding problem such as prolonged bleeding following surgeries or cuts?  10. NO - Have you ever had problems with anemia or been told to take iron pills?  11. NO - Have you had any abnormal blood loss such as black, tarry or bloody stools, or abnormal vaginal bleeding?  12. NO - Have you ever had a blood transfusion?  13. NO - Have you or any of your relatives ever had problems with anesthesia?  14. NO - Do you have sleep apnea, excessive snoring or  daytime drowsiness?  15. NO - Do you have any prosthetic heart valves?  16. NO - Do you have prosthetic joints?  17. NO - Is there any chance that you may be pregnant?      HPI:     HPI related to upcoming procedure: about 3 weeks ago was rock climbing, left arm reached high and to the left and pulled hard, immediate shoulder pain.     IMPRESSION:   1. Biceps long head tendon high-grade tear. Although there may be a  few intact fibers extending to the superior labral attachment, the  bulk of the tendon appears retracted distal to the bicipital groove  and has an irregular thickened appearance. This finding is new since  9/8/2017.  2. Supraspinatus mild focal tendinosis without evidence of krissy  transverse tendon tear or rupture, similar, if not unchanged, compared  to 9/8/2017.  3. Suspected small posterior labral small tear. If confirmation is  clinically warranted, MR arthrography would be more sensitive.      See problem list for active medical problems.  Problems all longstanding and stable, except as noted/documented.  See ROS for pertinent symptoms related to these conditions.                                                                                                  .  HYPERLIPIDEMIA - Patient has a long history of significant Hyperlipidemia requiring medication for treatment with recent unable to assess control.                                                                                      Patient tested negative for Factor V Leiden in 2006    MEDICAL HISTORY:     Patient Active Problem List    Diagnosis Date Noted     Achilles tendon tear 01/11/2012     Priority: Medium     Other postprocedural status(V45.89) 01/11/2012     Priority: Medium     Achilles tendon pain 01/11/2012     Priority: Medium     Meniscus, medial, derangement 10/19/2011     Priority: Medium     Hyperlipidemia 01/17/2006     Priority: Medium     Problem list name updated by automated process. Provider to review        Past  Medical History:   Diagnosis Date     PONV (postoperative nausea and vomiting)      Past Surgical History:   Procedure Laterality Date     ARTHROSCOPY KNEE WITH MEDIAL MENISCECTOMY  10/19/2011    Procedure:ARTHROSCOPY KNEE WITH MEDIAL MENISCECTOMY; Right Knee Arthroscopy with Medial Menisectomy--Anes Choice; Surgeon:SUMI BEAN; Location:[place] OR     HERNIORRHAPHY HIATAL  1991     REPAIR TENDON ACHILLES  11/14/2011    Procedure:REPAIR TENDON ACHILLES; Left ankle achilles tendon rupure repair-popliteal block; Surgeon:KATINA STEIN; Location:[place] OR     Current Outpatient Prescriptions   Medication Sig Dispense Refill     ibuprofen (ADVIL/MOTRIN) 800 MG tablet Take 1 tablet (800 mg) by mouth every 8 hours as needed for moderate pain (Patient not taking: Reported on 4/4/2018) 30 tablet 1     HYDROcodone-acetaminophen (NORCO) 5-325 MG per tablet Take 1 tablet by mouth every 6 hours as needed (Patient not taking: Reported on 4/4/2018) 15 tablet 0     OTC products: None, except as noted above - has not been using Norco or Ibuprofen    Allergies   Allergen Reactions     Nkda [No Known Drug Allergies]       Latex Allergy: NO    Social History   Substance Use Topics     Smoking status: Never Smoker     Smokeless tobacco: Never Used     Alcohol use Yes      Comment: Monthly     History   Drug Use No       REVIEW OF SYSTEMS:   CONSTITUTIONAL: NEGATIVE for fever, chills, change in weight  INTEGUMENTARY/SKIN: NEGATIVE for worrisome rashes, moles or lesions  EYES: NEGATIVE for vision changes or irritation  ENT/MOUTH: NEGATIVE for ear, mouth and throat problems  RESP: NEGATIVE for significant cough or SOB  BREAST: NEGATIVE for masses, tenderness or discharge  CV: NEGATIVE for chest pain, palpitations or peripheral edema  GI: NEGATIVE for nausea, abdominal pain, heartburn, or change in bowel habits  : NEGATIVE for frequency, dysuria, or hematuria  MUSCULOSKELETAL: NEGATIVE for significant arthralgias or myalgia POSITIVE  "painful left shoulder  NEURO: NEGATIVE for weakness, dizziness or paresthesias  ENDOCRINE: NEGATIVE for temperature intolerance, skin/hair changes  HEME: NEGATIVE for bleeding problems  PSYCHIATRIC: NEGATIVE for changes in mood or affect    EXAM:   /84 (BP Location: Right arm, Patient Position: Sitting, Cuff Size: Adult Regular)  Pulse 73  Temp 98.2  F (36.8  C) (Tympanic)  Ht 5' 5.5\" (1.664 m)  Wt 149 lb 6.4 oz (67.8 kg)  BMI 24.48 kg/m2    GENERAL APPEARANCE: healthy, alert and no distress     EYES: EOMI,  PERRL     HENT: ear canals and TM's normal and nose and mouth without ulcers or lesions     NECK: no adenopathy, no asymmetry, masses, or scars and thyroid normal to palpation     RESP: lungs clear to auscultation - no rales, rhonchi or wheezes     CV: regular rates and rhythm, normal S1 S2, no S3 or S4 and no murmur, click or rub     ABDOMEN:  soft, nontender, no HSM or masses and bowel sounds normal     MS: extremities normal- no gross deformities noted, no evidence of inflammation in joints, FROM in all extremities.     SKIN: no suspicious lesions or rashes     NEURO: Normal strength and tone, sensory exam grossly normal, mentation intact and speech normal     PSYCH: mentation appears normal. and affect normal/bright     LYMPHATICS: No cervical adenopathy    DIAGNOSTICS:     EKG: Not indicated due to non-vascular surgery and low risk of event (age <65 and without cardiac risk factors)  Labs Resulted Today:   Results for orders placed or performed in visit on 04/04/18   CBC with platelets differential   Result Value Ref Range    WBC 7.8 4.0 - 11.0 10e9/L    RBC Count 5.45 4.4 - 5.9 10e12/L    Hemoglobin 15.0 13.3 - 17.7 g/dL    Hematocrit 46.0 40.0 - 53.0 %    MCV 84 78 - 100 fl    MCH 27.5 26.5 - 33.0 pg    MCHC 32.6 31.5 - 36.5 g/dL    RDW 13.1 10.0 - 15.0 %    Platelet Count 270 150 - 450 10e9/L    Diff Method Automated Method     % Neutrophils 55.2 %    % Lymphocytes 30.6 %    % Monocytes 10.2 % "    % Eosinophils 3.6 %    % Basophils 0.4 %    Absolute Neutrophil 4.3 1.6 - 8.3 10e9/L    Absolute Lymphocytes 2.4 0.8 - 5.3 10e9/L    Absolute Monocytes 0.8 0.0 - 1.3 10e9/L    Absolute Eosinophils 0.3 0.0 - 0.7 10e9/L    Absolute Basophils 0.0 0.0 - 0.2 10e9/L       Recent Labs   Lab Test  11/11/11   0802   HGB  14.2        IMPRESSION:   Reason for surgery/procedure: left biceps tendon tear / left biceps tendon repair  Diagnosis/reason for consult: preoperative exam    The proposed surgical procedure is considered INTERMEDIATE risk.    REVISED CARDIAC RISK INDEX  The patient has the following serious cardiovascular risks for perioperative complications such as (MI, PE, VFib and 3  AV Block):  No serious cardiac risks  INTERPRETATION: 0 risks: Class I (very low risk - 0.4% complication rate)    The patient has the following additional risks for perioperative complications:  No identified additional risks      ICD-10-CM    1. Preop general physical exam Z01.818    2. Biceps tendon tear S46.219A    3. Hyperlipidemia, unspecified hyperlipidemia type E78.5        RECOMMENDATIONS:     --Patient is on no chronic medications    APPROVAL GIVEN to proceed with proposed procedure, without further diagnostic evaluation     Note: Has had postoperative nausea in the past after anesthesia    Signed Electronically by: Kelsi Romero PA-C    Copy of this evaluation report is provided to requesting physician.    [facility] Preop Guidelines

## 2018-04-09 ENCOUNTER — TRANSFERRED RECORDS (OUTPATIENT)
Dept: HEALTH INFORMATION MANAGEMENT | Facility: CLINIC | Age: 43
End: 2018-04-09

## 2018-04-24 ENCOUNTER — TRANSFERRED RECORDS (OUTPATIENT)
Dept: HEALTH INFORMATION MANAGEMENT | Facility: CLINIC | Age: 43
End: 2018-04-24

## 2018-04-25 ENCOUNTER — THERAPY VISIT (OUTPATIENT)
Dept: PHYSICAL THERAPY | Facility: CLINIC | Age: 43
End: 2018-04-25
Payer: COMMERCIAL

## 2018-04-25 DIAGNOSIS — M25.512 ACUTE PAIN OF LEFT SHOULDER: Primary | ICD-10-CM

## 2018-04-25 PROCEDURE — 97110 THERAPEUTIC EXERCISES: CPT | Mod: GP | Performed by: PHYSICAL THERAPIST

## 2018-04-25 PROCEDURE — 97161 PT EVAL LOW COMPLEX 20 MIN: CPT | Mod: GP | Performed by: PHYSICAL THERAPIST

## 2018-04-25 NOTE — PROGRESS NOTES
[facility] Initial Evaluation  Subjective:  Patient is a 42 year old male presenting with rehab left shoulder hpi. The history is provided by the patient. No  was used.   Shamir Shea is a 42 year old male with a left shoulder condition.  Condition occurred with:  Other (indoor rock climbing - pulling himself up).  Condition occurred: during recreation/sport.  This is a new condition  DOI 3/15/18  DOS 4/9/18.    Patient reports pain:  Lateral and upper arm.    Pain is described as aching (deep, strong pain) and is constant and reported as 2/10 (in the night last night was up to 7/10; no longer taking anything for pain even OTC).  Associated symptoms:  Loss of strength. Pain is the same all the time.  Symptoms are exacerbated by certain positions and relieved by bracing/immobilizing.  Since onset symptoms are gradually improving.  Special tests:  MRI.  Previous treatment includes surgery.    General health as reported by patient is good.    Medical allergies: no.  Other surgeries include:  Orthopedic surgery (ruptured achilles (2011), meniscectomy (2011)).  Current medications:  None as reported by the patient.  Current occupation is [employer]. : currently working 3 days/wk from home using only R UE to type. It's very awkward and creates some back/neck pain due to the poor positioning. He is working on an important project though, so unable to work less. .  Patient is working in normal job with restrictions.  Primary job tasks include:  Prolonged sitting (computer work; working 3 days/wk with R hand typing only).    Barriers include:  None as reported by the patient.    Red flags:  None as reported by the patient.                        Objective:  System                   Shoulder Evaluation:  ROM:  AROM:  : R wnl AROM; L AROM able to get to neutral ER and neutral ext.  Flexion:  Left:  35              External Rotation:  Left:  0                    PROM:    Flexion:   Left:  45                                                                               General     ROS    Assessment/Plan:    Patient is a 42 year old male with left side shoulder complaints.    Patient has the following significant findings with corresponding treatment plan.                Diagnosis 1:  S/p arthroscopy, biceps tenodesis, acromioplasty  Pain -  hot/cold therapy, US, electric stimulation, manual therapy, splint/taping/bracing/orthotics, education and home program  Decreased ROM/flexibility - manual therapy, therapeutic exercise, therapeutic activity and home program  Decreased joint mobility - manual therapy, therapeutic exercise and home program  Decreased strength - therapeutic exercise, therapeutic activities and home program  Inflammation - cold therapy and self management/home program  Impaired muscle performance - neuro re-education and home program  Impaired posture - neuro re-education and home program    Therapy Evaluation Codes:   1) History comprised of:   Personal factors that impact the plan of care:      None.    Comorbidity factors that impact the plan of care are:      None.     Medications impacting care: None.  2) Examination of Body Systems comprised of:   Body structures and functions that impact the plan of care:      Shoulder.   Activity limitations that impact the plan of care are:      Bathing, Driving, Dressing, Lifting, Reading/Computer work, Running, Working and Sleeping.  3) Clinical presentation characteristics are:   Stable/Uncomplicated.  4) Decision-Making    Low complexity using standardized patient assessment instrument and/or measureable assessment of functional outcome.  Cumulative Therapy Evaluation is: Low complexity.    Previous and current functional limitations:  (See Goal Flow Sheet for this information)    Short term and Long term goals: (See Goal Flow Sheet for this information)     Communication ability:  Patient appears to be able to clearly communicate  and understand verbal and written communication and follow directions correctly.  Treatment Explanation - The following has been discussed with the patient:   RX ordered/plan of care  Anticipated outcomes  Possible risks and side effects  This patient would benefit from PT intervention to resume normal activities.   Rehab potential is excellent.    Frequency:  2 X week, once daily  Duration:  for 8 weeks  Discharge Plan:  Achieve all LTG.  Independent in home treatment program.  Reach maximal therapeutic benefit.    Please refer to the daily flowsheet for treatment today, total treatment time and time spent performing 1:1 timed codes.

## 2018-04-25 NOTE — MR AVS SNAPSHOT
After Visit Summary   4/25/2018    Shamir Shea    MRN: [id number]           Patient Information     Date Of Birth          1975        Visit Information        Provider Department      4/25/2018 8:40 AM Luiza Andino, PT [facility]        Today's Diagnoses     Acute pain of left shoulder    -  1       Follow-ups after your visit        Your next 10 appointments already scheduled     May 02, 2018  9:30 AM CDT   LASHAWN Extremity with Dawna Veliz [facility]    [address]  [facility] [address] [phone]            May 09, 2018  9:30 AM CDT   LASHAWN Extremity with Dawna Veliz [facility] ([facility])    [address]  [facility] [address] [phone]              Who to contact     If you have questions or need follow up information about today's clinic visit or your schedule please contact [facility] directly at [phone].  Normal or non-critical lab and imaging results will be communicated to you by OurStagehart, letter or phone within 4 business days after the clinic has received the results. If you do not hear from us within 7 days, please contact the clinic through Lenovot or phone. If you have a critical or abnormal lab result, we will notify you by phone as soon as possible.  Submit refill requests through [employer] or call your pharmacy and they will forward the refill request to us. Please allow 3 business days for your refill to be completed.          Additional Information About Your Visit        OurStagehart Information     [employer] gives you secure access to your electronic health record. If you see a primary care provider, you can also send messages to your care team and make appointments. If you have questions, please call your primary care clinic.  If you do not have a primary care provider, please call [phone] and they will assist you.        Care  EveryWhere ID     This is your Care EveryWhere ID. This could be used by other organizations to access your [facility] medical records  [id number]         Blood Pressure from Last 3 Encounters:   04/04/18 127/84   03/15/18 110/72   02/23/18 136/86    Weight from Last 3 Encounters:   04/04/18 67.8 kg (149 lb 6.4 oz)   03/15/18 67.9 kg (149 lb 11.2 oz)   02/23/18 67.2 kg (148 lb 3.2 oz)              We Performed the Following     HC PT EVAL, LOW COMPLEXITY     LASHAWN INITIAL EVAL REPORT     THERAPEUTIC EXERCISES        Primary Care Provider Office Phone # Fax #    Humberto Schrader [phone]       [facility] [address] [facility] [address]        Equal Access to Services     LEVI MAHONEY : Hadii aad ku hadasho Solupis, waaxda luqadaha, qaybta kaalmada adealexyapayton, osmel preciado . [facility] [phone].    ATENCIÓN: Si habla español, tiene a cuadra disposición servicios gratuitos de asistencia lingüística. [facility] [phone].    We comply with applicable federal civil rights laws and [place] laws. We do not discriminate on the basis of race, color, national origin, age, disability, sex, sexual orientation, or gender identity.            Thank you!     Thank you for choosing [facility]  for your care. Our goal is always to provide you with excellent care. Hearing back from our patients is one way we can continue to improve our services. Please take a few minutes to complete the written survey that you may receive in the mail after your visit with us. Thank you!             Your Updated Medication List - Protect others around you: Learn how to safely use, store and throw away your medicines at www.disposemymeds.org.          This list is accurate as of 4/25/18  1:22 PM.  Always use your most recent med list.                   Brand Name Dispense Instructions for use Diagnosis    HYDROcodone-acetaminophen 5-325 MG per tablet    NORCO    15  tablet    Take 1 tablet by mouth every 6 hours as needed    Shoulder injury, left, initial encounter       ibuprofen 800 MG tablet    ADVIL/MOTRIN    30 tablet    Take 1 tablet (800 mg) by mouth every 8 hours as needed for moderate pain    Shoulder injury, left, initial encounter

## 2018-05-02 ENCOUNTER — THERAPY VISIT (OUTPATIENT)
Dept: PHYSICAL THERAPY | Facility: CLINIC | Age: 43
End: 2018-05-02
Payer: COMMERCIAL

## 2018-05-02 DIAGNOSIS — M25.512 ACUTE PAIN OF LEFT SHOULDER: ICD-10-CM

## 2018-05-02 PROCEDURE — 97110 THERAPEUTIC EXERCISES: CPT | Mod: GP

## 2018-05-09 ENCOUNTER — THERAPY VISIT (OUTPATIENT)
Dept: PHYSICAL THERAPY | Facility: CLINIC | Age: 43
End: 2018-05-09
Payer: COMMERCIAL

## 2018-05-09 DIAGNOSIS — M25.512 ACUTE PAIN OF LEFT SHOULDER: ICD-10-CM

## 2018-05-09 PROCEDURE — 97110 THERAPEUTIC EXERCISES: CPT | Mod: GP

## 2018-05-14 ENCOUNTER — THERAPY VISIT (OUTPATIENT)
Dept: PHYSICAL THERAPY | Facility: CLINIC | Age: 43
End: 2018-05-14
Payer: COMMERCIAL

## 2018-05-14 DIAGNOSIS — M25.512 ACUTE PAIN OF LEFT SHOULDER: ICD-10-CM

## 2018-05-14 PROCEDURE — 97110 THERAPEUTIC EXERCISES: CPT | Mod: GP

## 2018-05-16 ENCOUNTER — THERAPY VISIT (OUTPATIENT)
Dept: PHYSICAL THERAPY | Facility: CLINIC | Age: 43
End: 2018-05-16
Payer: COMMERCIAL

## 2018-05-16 DIAGNOSIS — M25.512 ACUTE PAIN OF LEFT SHOULDER: ICD-10-CM

## 2018-05-16 PROCEDURE — 97110 THERAPEUTIC EXERCISES: CPT | Mod: GP

## 2018-05-21 ENCOUNTER — THERAPY VISIT (OUTPATIENT)
Dept: PHYSICAL THERAPY | Facility: CLINIC | Age: 43
End: 2018-05-21
Payer: COMMERCIAL

## 2018-05-21 DIAGNOSIS — M25.512 ACUTE PAIN OF LEFT SHOULDER: ICD-10-CM

## 2018-05-21 PROCEDURE — 97110 THERAPEUTIC EXERCISES: CPT | Mod: GP | Performed by: PHYSICAL THERAPIST

## 2018-05-21 PROCEDURE — 97140 MANUAL THERAPY 1/> REGIONS: CPT | Mod: GP | Performed by: PHYSICAL THERAPIST

## 2018-05-21 NOTE — LETTER
[facility]  [address] [place] [address]  [phone]    May 21, 2018    Re: Shamir Shea   :   1975  MRN:  [id number]   REFERRING PHYSICIAN:   Don Edouard MD    [facility]    Date of Initial Evaluation:  2018  Visits:  Rxs Used: 6  Reason for Referral:  Acute pain of left shoulder    PROGRESS  REPORT    Progress reporting period is from 18 to 18.       SUBJECTIVE  Subjective changes noted by patient:    Subjective: Shamir was feeling really good up until the past few days shoulder has become more sore, especially with flexion stretch.  He is having a hard time getting comfortable for sleeping too.    Current pain level is 0/10, but more sore with stretches     Changes in function:  Yes (See Goal flowsheet attached for changes in current functional level)  Adverse reaction to treatment or activity: activity - stretching more more the past few days    OBJECTIVE  Changes noted in objective findings:  Yes,   Objective: Area in muscle belly of biceps that feels firm and tender, better after soft tissue mobilization, which also helped comfort of flexion stretch. PROM : 110 flexion, 45 ER, 50 IR.  Normal resisted shoulder abd, ext and ER, IR.  Did not test shoulder flexion, elbow flexion due to biceps restrictions     ASSESSMENT/PLAN  Updated problem list and treatment plan: Diagnosis 1:  Sp biceps tenodesis  Pain -  hot/cold therapy and manual therapy  Decreased ROM/flexibility - manual therapy and therapeutic exercise  Decreased strength - therapeutic exercise and therapeutic activities  STG/LTGs have been met or progress has been made towards goals:  Yes (See Goal flow sheet completed today.)  Assessment of Progress: The patient's condition is improving.  Self Management Plans:  Patient has been instructed in a home treatment program.  I have re-evaluated this patient and find that the nature, scope, duration and intensity of the  therapy is appropriate for the medical condition of the patient.  Shamir continues to require the following intervention to meet STG and LTG's:  PT      Re: Shamir Shea   :   1975          Recommendations:  This patient would benefit from continued therapy.     Frequency:  2 X week, once daily  Duration:  for 4 weeks and progress according to Physician's protocol          Thank you for your referral.    INQUIRIES  Therapist: Emerald Juarez, PT   [facility]  [address]  [facility] [address]  Phone: [phone]

## 2018-05-21 NOTE — PROGRESS NOTES
Subjective:  HPI                    Objective:  System    Physical Exam    General     ROS    Assessment/Plan:    PROGRESS  REPORT    Progress reporting period is from 4/25/18 to 5/21/18.       SUBJECTIVE  Subjective changes noted by patient:    Subjective: Shamir was feeling really good up until the past few days shoulder has become more sore, especially with flexion stretch.  He is having a hard time getting comfortable for sleeping too.    Current pain level is 0/10, but more sore with stretches     Changes in function:  Yes (See Goal flowsheet attached for changes in current functional level)  Adverse reaction to treatment or activity: activity - stretching more more the past few days    OBJECTIVE  Changes noted in objective findings:  Yes,   Objective: Area in muscle belly of biceps that feels firm and tender, better after soft tissue mobilization, which also helped comfort of flexion stretch. PROM : 110 flexion, 45 ER, 50 IR.  Normal resisted shoulder abd, ext and ER, IR.  Did not test shoulder flexion, elbow flexion due to biceps restrictions     ASSESSMENT/PLAN  Updated problem list and treatment plan: Diagnosis 1:  Sp biceps tenodesis  Pain -  hot/cold therapy and manual therapy  Decreased ROM/flexibility - manual therapy and therapeutic exercise  Decreased strength - therapeutic exercise and therapeutic activities  STG/LTGs have been met or progress has been made towards goals:  Yes (See Goal flow sheet completed today.)  Assessment of Progress: The patient's condition is improving.  Self Management Plans:  Patient has been instructed in a home treatment program.  I have re-evaluated this patient and find that the nature, scope, duration and intensity of the therapy is appropriate for the medical condition of the patient.  Shamir continues to require the following intervention to meet STG and LTG's:  PT    Recommendations:  This patient would benefit from continued therapy.     Frequency:  2 X week, once  daily  Duration:  for 4 weeks and progress according to Physician's protocol        Please refer to the daily flowsheet for treatment today, total treatment time and time spent performing 1:1 timed codes.

## 2018-05-21 NOTE — MR AVS SNAPSHOT
After Visit Summary   5/21/2018    Shamir Shea    MRN: [id number]           Patient Information     Date Of Birth          1975        Visit Information        Provider Department      5/21/2018 9:00 AM Emerald Juarez PT [facility]        Today's Diagnoses     Acute pain of left shoulder           Follow-ups after your visit        Your next 10 appointments already scheduled     May 23, 2018  1:50 PM CDT   LASHAWN Extremity with Emerald Juarez PT   [facility] ([facility])    [address] [facility] [facility] [address]   [phone]            Jun 04, 2018  9:00 AM CDT   LASHAWN Extremity with Dawna Veliz ATC   [facility] ([facility])    [address] [address]   [phone]            Jun 08, 2018  8:50 AM CDT   LASHAWN Extremity with Emerald Juarez PT   [facility] ([facility])    [address] [address]   [phone]            Jun 11, 2018  9:00 AM CDT   LASHAWN Extremity with Dawna Veliz ATC   [facility] ([facility])    [address]  [facility] [address]   [phone]            Magnus 15, 2018  8:50 AM CDT   LASHAWN Extremity with Emerald Juarez PT   [facility] ([facility])    [address]  [facility] [address]   [phone]            Jun 18, 2018  9:00 AM CDT   LASHAWN Extremity with Dawna Veliz ATC   [facility] ([facility])    [address] [facility] [address]   [phone]            Jun 22, 2018  8:50 AM CDT   LASHAWN Extremity with Emerald Juarez PT   [facility] ([facility])    [address] [facility] [address]   [phone]              Who to contact     If you have questions or need follow up information about today's clinic visit or your schedule please contact [facility] directly at [phone].  Normal or non-critical lab and imaging results will  be communicated to you by MyChart, letter or phone within 4 business days after the clinic has received the results. If you do not hear from us within 7 days, please contact the clinic through Numbrs AGt or phone. If you have a critical or abnormal lab result, we will notify you by phone as soon as possible.  Submit refill requests through Trellie or call your pharmacy and they will forward the refill request to us. Please allow 3 business days for your refill to be completed.          Additional Information About Your Visit        Trellie Information     Trellie gives you secure access to your electronic health record. If you see a primary care provider, you can also send messages to your care team and make appointments. If you have questions, please call your primary care clinic.  If you do not have a primary care provider, please call [phone] and they will assist you.        Care EveryWhere ID     This is your Care EveryWhere ID. This could be used by other organizations to access your [place] medical records  [id number]         Blood Pressure from Last 3 Encounters:   04/04/18 127/84   03/15/18 110/72   02/23/18 136/86    Weight from Last 3 Encounters:   04/04/18 67.8 kg (149 lb 6.4 oz)   03/15/18 67.9 kg (149 lb 11.2 oz)   02/23/18 67.2 kg (148 lb 3.2 oz)              We Performed the Following     [facility] Progress Notes Report     Manual Ther Tech, 1+Regions, EA 15 min     Therapeutic Exercises        Primary Care Provider Office Phone # Fax #    Humberto Schrader [phone] [address] [facility] [address]        Equal Access to Services     [facility]: Hadii aad ku hadasho Soomaali, waaxda luqadaha, qaybta kaalmada adeegyapayton, osmel cancino. So [facility] [phone].    ATENCIÓN: Si habla español, tiene a cuadra disposición servicios gratuitos de asistencia lingüística. Llame al [phone].    We comply with applicable federal civil rights laws and  [place] laws. We do not discriminate on the basis of race, color, national origin, age, disability, sex, sexual orientation, or gender identity.            Thank you!     Thank you for choosing [facility]  for your care. Our goal is always to provide you with excellent care. Hearing back from our patients is one way we can continue to improve our services. Please take a few minutes to complete the written survey that you may receive in the mail after your visit with us. Thank you!             Your Updated Medication List - Protect others around you: Learn how to safely use, store and throw away your medicines at www.disposemymeds.org.          This list is accurate as of 5/21/18 12:54 PM.  Always use your most recent med list.                   Brand Name Dispense Instructions for use Diagnosis    HYDROcodone-acetaminophen 5-325 MG per tablet    NORCO    15 tablet    Take 1 tablet by mouth every 6 hours as needed    Shoulder injury, left, initial encounter       ibuprofen 800 MG tablet    ADVIL/MOTRIN    30 tablet    Take 1 tablet (800 mg) by mouth every 8 hours as needed for moderate pain    Shoulder injury, left, initial encounter

## 2018-05-23 ENCOUNTER — THERAPY VISIT (OUTPATIENT)
Dept: PHYSICAL THERAPY | Facility: CLINIC | Age: 43
End: 2018-05-23
Payer: COMMERCIAL

## 2018-05-23 ENCOUNTER — TRANSFERRED RECORDS (OUTPATIENT)
Dept: HEALTH INFORMATION MANAGEMENT | Facility: CLINIC | Age: 43
End: 2018-05-23

## 2018-05-23 DIAGNOSIS — M25.512 ACUTE PAIN OF LEFT SHOULDER: ICD-10-CM

## 2018-05-23 PROCEDURE — 97112 NEUROMUSCULAR REEDUCATION: CPT | Mod: GP | Performed by: PHYSICAL THERAPIST

## 2018-05-23 PROCEDURE — 97110 THERAPEUTIC EXERCISES: CPT | Mod: GP | Performed by: PHYSICAL THERAPIST

## 2018-06-04 ENCOUNTER — THERAPY VISIT (OUTPATIENT)
Dept: PHYSICAL THERAPY | Facility: CLINIC | Age: 43
End: 2018-06-04
Payer: COMMERCIAL

## 2018-06-04 DIAGNOSIS — M25.512 ACUTE PAIN OF LEFT SHOULDER: ICD-10-CM

## 2018-06-04 PROCEDURE — 97110 THERAPEUTIC EXERCISES: CPT | Mod: GP

## 2018-06-04 PROCEDURE — 97112 NEUROMUSCULAR REEDUCATION: CPT | Mod: GP

## 2018-06-04 PROCEDURE — 97530 THERAPEUTIC ACTIVITIES: CPT | Mod: GP

## 2018-06-08 ENCOUNTER — THERAPY VISIT (OUTPATIENT)
Dept: PHYSICAL THERAPY | Facility: CLINIC | Age: 43
End: 2018-06-08
Payer: COMMERCIAL

## 2018-06-08 DIAGNOSIS — M25.512 ACUTE PAIN OF LEFT SHOULDER: ICD-10-CM

## 2018-06-08 PROCEDURE — 97140 MANUAL THERAPY 1/> REGIONS: CPT | Mod: GP | Performed by: PHYSICAL THERAPIST

## 2018-06-08 PROCEDURE — 97112 NEUROMUSCULAR REEDUCATION: CPT | Mod: GP | Performed by: PHYSICAL THERAPIST

## 2018-06-08 PROCEDURE — 97110 THERAPEUTIC EXERCISES: CPT | Mod: GP | Performed by: PHYSICAL THERAPIST

## 2018-06-11 ENCOUNTER — THERAPY VISIT (OUTPATIENT)
Dept: PHYSICAL THERAPY | Facility: CLINIC | Age: 43
End: 2018-06-11
Payer: COMMERCIAL

## 2018-06-11 DIAGNOSIS — M25.512 ACUTE PAIN OF LEFT SHOULDER: ICD-10-CM

## 2018-06-11 PROCEDURE — 97110 THERAPEUTIC EXERCISES: CPT | Mod: GP

## 2018-06-11 PROCEDURE — 97112 NEUROMUSCULAR REEDUCATION: CPT | Mod: GP

## 2018-06-15 ENCOUNTER — THERAPY VISIT (OUTPATIENT)
Dept: PHYSICAL THERAPY | Facility: CLINIC | Age: 43
End: 2018-06-15
Payer: COMMERCIAL

## 2018-06-15 DIAGNOSIS — M25.512 ACUTE PAIN OF LEFT SHOULDER: ICD-10-CM

## 2018-06-15 PROCEDURE — 97140 MANUAL THERAPY 1/> REGIONS: CPT | Mod: GP | Performed by: PHYSICAL THERAPIST

## 2018-06-15 PROCEDURE — 97110 THERAPEUTIC EXERCISES: CPT | Mod: GP | Performed by: PHYSICAL THERAPIST

## 2018-06-18 ENCOUNTER — THERAPY VISIT (OUTPATIENT)
Dept: PHYSICAL THERAPY | Facility: CLINIC | Age: 43
End: 2018-06-18
Payer: COMMERCIAL

## 2018-06-18 DIAGNOSIS — M25.512 ACUTE PAIN OF LEFT SHOULDER: ICD-10-CM

## 2018-06-18 PROCEDURE — 97112 NEUROMUSCULAR REEDUCATION: CPT | Mod: GP

## 2018-06-18 PROCEDURE — 97140 MANUAL THERAPY 1/> REGIONS: CPT | Mod: GP

## 2018-06-18 PROCEDURE — 97110 THERAPEUTIC EXERCISES: CPT | Mod: GP

## 2018-06-22 ENCOUNTER — THERAPY VISIT (OUTPATIENT)
Dept: PHYSICAL THERAPY | Facility: CLINIC | Age: 43
End: 2018-06-22
Payer: COMMERCIAL

## 2018-06-22 DIAGNOSIS — M25.512 ACUTE PAIN OF LEFT SHOULDER: ICD-10-CM

## 2018-06-22 PROCEDURE — 97140 MANUAL THERAPY 1/> REGIONS: CPT | Mod: GP | Performed by: PHYSICAL THERAPIST

## 2018-06-22 PROCEDURE — 97112 NEUROMUSCULAR REEDUCATION: CPT | Mod: GP | Performed by: PHYSICAL THERAPIST

## 2018-06-22 PROCEDURE — 97110 THERAPEUTIC EXERCISES: CPT | Mod: GP | Performed by: PHYSICAL THERAPIST

## 2018-06-25 ENCOUNTER — THERAPY VISIT (OUTPATIENT)
Dept: PHYSICAL THERAPY | Facility: CLINIC | Age: 43
End: 2018-06-25
Payer: COMMERCIAL

## 2018-06-25 DIAGNOSIS — M25.512 ACUTE PAIN OF LEFT SHOULDER: ICD-10-CM

## 2018-06-25 PROCEDURE — 97110 THERAPEUTIC EXERCISES: CPT | Mod: GP

## 2018-06-25 PROCEDURE — 97140 MANUAL THERAPY 1/> REGIONS: CPT | Mod: GP

## 2018-06-25 PROCEDURE — 97112 NEUROMUSCULAR REEDUCATION: CPT | Mod: GP

## 2018-06-27 ENCOUNTER — THERAPY VISIT (OUTPATIENT)
Dept: PHYSICAL THERAPY | Facility: CLINIC | Age: 43
End: 2018-06-27
Payer: COMMERCIAL

## 2018-06-27 DIAGNOSIS — M25.512 ACUTE PAIN OF LEFT SHOULDER: ICD-10-CM

## 2018-06-27 PROCEDURE — 97140 MANUAL THERAPY 1/> REGIONS: CPT | Mod: GP

## 2018-06-27 PROCEDURE — 97110 THERAPEUTIC EXERCISES: CPT | Mod: GP

## 2018-06-27 PROCEDURE — 97112 NEUROMUSCULAR REEDUCATION: CPT | Mod: GP

## 2018-07-03 ENCOUNTER — TRANSFERRED RECORDS (OUTPATIENT)
Dept: HEALTH INFORMATION MANAGEMENT | Facility: CLINIC | Age: 43
End: 2018-07-03

## 2018-07-09 ENCOUNTER — THERAPY VISIT (OUTPATIENT)
Dept: PHYSICAL THERAPY | Facility: CLINIC | Age: 43
End: 2018-07-09
Payer: COMMERCIAL

## 2018-07-09 DIAGNOSIS — M25.512 ACUTE PAIN OF LEFT SHOULDER: ICD-10-CM

## 2018-07-09 PROCEDURE — 97140 MANUAL THERAPY 1/> REGIONS: CPT | Mod: GP

## 2018-07-09 PROCEDURE — 97112 NEUROMUSCULAR REEDUCATION: CPT | Mod: GP

## 2018-07-09 PROCEDURE — 97110 THERAPEUTIC EXERCISES: CPT | Mod: GP

## 2018-07-11 ENCOUNTER — THERAPY VISIT (OUTPATIENT)
Dept: PHYSICAL THERAPY | Facility: CLINIC | Age: 43
End: 2018-07-11
Payer: COMMERCIAL

## 2018-07-11 DIAGNOSIS — M25.512 ACUTE PAIN OF LEFT SHOULDER: ICD-10-CM

## 2018-07-11 PROCEDURE — 97112 NEUROMUSCULAR REEDUCATION: CPT | Mod: GP

## 2018-07-11 PROCEDURE — 97110 THERAPEUTIC EXERCISES: CPT | Mod: GP

## 2018-07-11 PROCEDURE — 97140 MANUAL THERAPY 1/> REGIONS: CPT | Mod: GP

## 2018-07-18 ENCOUNTER — THERAPY VISIT (OUTPATIENT)
Dept: PHYSICAL THERAPY | Facility: CLINIC | Age: 43
End: 2018-07-18
Payer: COMMERCIAL

## 2018-07-18 DIAGNOSIS — M25.512 ACUTE PAIN OF LEFT SHOULDER: ICD-10-CM

## 2018-07-18 PROCEDURE — 97140 MANUAL THERAPY 1/> REGIONS: CPT | Mod: GP

## 2018-07-18 PROCEDURE — 97110 THERAPEUTIC EXERCISES: CPT | Mod: GP

## 2018-07-18 PROCEDURE — 97112 NEUROMUSCULAR REEDUCATION: CPT | Mod: GP

## 2018-07-25 ENCOUNTER — THERAPY VISIT (OUTPATIENT)
Dept: PHYSICAL THERAPY | Facility: CLINIC | Age: 43
End: 2018-07-25
Payer: COMMERCIAL

## 2018-07-25 DIAGNOSIS — M25.512 ACUTE PAIN OF LEFT SHOULDER: ICD-10-CM

## 2018-07-25 PROCEDURE — 97140 MANUAL THERAPY 1/> REGIONS: CPT | Mod: GP | Performed by: PHYSICAL THERAPIST

## 2018-07-25 PROCEDURE — 97112 NEUROMUSCULAR REEDUCATION: CPT | Mod: GP | Performed by: PHYSICAL THERAPIST

## 2018-07-25 PROCEDURE — 97110 THERAPEUTIC EXERCISES: CPT | Mod: GP | Performed by: PHYSICAL THERAPIST

## 2018-07-25 NOTE — PROGRESS NOTES
Subjective:  HPI                    Objective:  System    Physical Exam    General     ROS    Assessment/Plan:    DISCHARGE REPORT    Progress reporting period is from 4/25/18 to 7/25/2018.       SUBJECTIVE  Subjective changes noted by patient:    Subjective: Shamir is doing home exercise program without pain, but does feel weakness still with advanced exercises such as bench dips. Push ups and other advanced exercises going well.      Current pain level is 0/10  .       Changes in function:  Yes (See Goal flowsheet attached for changes in current functional level)  Adverse reaction to treatment or activity: None    OBJECTIVE  Changes noted in objective findings:  Yes,   Objective: Full AROM, PROM, but does feel and increased tightenss in left shoulder at end-range extension and IR.  Does not have pectoral or ER tightness anymore and overhead reaching is normal.  Strength: 5/5 shoulder flex and IR.   5-/5 ER and abd     ASSESSMENT/PLAN  Updated problem list and treatment plan: Diagnosis 1:  Sp biceps tenodesis  Decreased ROM/flexibility - home program  Decreased strength - home program  STG/LTGs have been met or progress has been made towards goals:  Yes (See Goal flow sheet completed today.)  Assessment of Progress: The patient's condition is improving.  Self Management Plans:  Patient has been instructed in a home treatment program.  I have re-evaluated this patient and find that the nature, scope, duration and intensity of the therapy is appropriate for the medical condition of the patient.  Shamir continues to require the following intervention to meet STG and LTG's:  PT    Recommendations:  This patient is ready to be discharged from therapy and continue their home treatment program.    Please refer to the daily flowsheet for treatment today, total treatment time and time spent performing 1:1 timed codes.

## 2018-07-25 NOTE — MR AVS SNAPSHOT
After Visit Summary   7/25/2018    Shamir Shea    MRN: [id number]           Patient Information     Date Of Birth          1975        Visit Information        Provider Department      7/25/2018 8:40 AM Emerald Juarez PT [facility]        Today's Diagnoses     Acute pain of left shoulder           Follow-ups after your visit        Who to contact     If you have questions or need follow up information about today's clinic visit or your schedule please contact [facility] directly at [phone].  Normal or non-critical lab and imaging results will be communicated to you by Appifierhart, letter or phone within 4 business days after the clinic has received the results. If you do not hear from us within 7 days, please contact the clinic through Yododot or phone. If you have a critical or abnormal lab result, we will notify you by phone as soon as possible.  Submit refill requests through Appbyme or call your pharmacy and they will forward the refill request to us. Please allow 3 business days for your refill to be completed.          Additional Information About Your Visit        MyChart Information     Appbyme gives you secure access to your electronic health record. If you see a primary care provider, you can also send messages to your care team and make appointments. If you have questions, please call your primary care clinic.  If you do not have a primary care provider, please call [phone] and they will assist you.        Care EveryWhere ID     This is your Care EveryWhere ID. This could be used by other organizations to access your [place] medical records  [id number]         Blood Pressure from Last 3 Encounters:   04/04/18 127/84   03/15/18 110/72   02/23/18 136/86    Weight from Last 3 Encounters:   04/04/18 67.8 kg (149 lb 6.4 oz)   03/15/18 67.9 kg (149 lb 11.2 oz)   02/23/18 67.2 kg (148 lb 3.2 oz)              We Performed the Following      [facility] Progress Notes Report     Manual Ther Tech, 1+Regions, EA 15 min     Neuromuscular Re-Education     Therapeutic Exercises        Primary Care Provider Office Phone # Fax #    Humberto Schrader [phone] [address] [facility] [address]        Equal Access to Services     [facility] : Hadii aad ku hadasho Soomaali, waaxda luqadaha, qaybta kaalmada adeegyada, waxay idiin hayaan adeeg khlylysh la'francinen ah. So [facility] [phone].    ATENCIÓN: Si habla español, tiene a cuadra disposición servicios gratuitos de asistencia lingüística. [facility] [phone].    We comply with applicable federal civil rights laws and [place] laws. We do not discriminate on the basis of race, color, national origin, age, disability, sex, sexual orientation, or gender identity.            Thank you!     Thank you for choosing [facility]  for your care. Our goal is always to provide you with excellent care. Hearing back from our patients is one way we can continue to improve our services. Please take a few minutes to complete the written survey that you may receive in the mail after your visit with us. Thank you!             Your Updated Medication List - Protect others around you: Learn how to safely use, store and throw away your medicines at www.disposemymeds.org.          This list is accurate as of 7/25/18  1:37 PM.  Always use your most recent med list.                   Brand Name Dispense Instructions for use Diagnosis    HYDROcodone-acetaminophen 5-325 MG per tablet    NORCO    15 tablet    Take 1 tablet by mouth every 6 hours as needed    Shoulder injury, left, initial encounter       ibuprofen 800 MG tablet    ADVIL/MOTRIN    30 tablet    Take 1 tablet (800 mg) by mouth every 8 hours as needed for moderate pain    Shoulder injury, left, initial encounter

## 2019-04-02 ENCOUNTER — TELEPHONE (OUTPATIENT)
Dept: FAMILY MEDICINE | Facility: CLINIC | Age: 44
End: 2019-04-02

## 2019-07-24 ENCOUNTER — OFFICE VISIT (OUTPATIENT)
Dept: FAMILY MEDICINE | Facility: CLINIC | Age: 44
End: 2019-07-24
Payer: COMMERCIAL

## 2019-07-24 VITALS
HEART RATE: 70 BPM | TEMPERATURE: 98.7 F | HEIGHT: 65 IN | SYSTOLIC BLOOD PRESSURE: 133 MMHG | DIASTOLIC BLOOD PRESSURE: 88 MMHG | OXYGEN SATURATION: 98 % | BODY MASS INDEX: 25.16 KG/M2 | RESPIRATION RATE: 16 BRPM | WEIGHT: 151 LBS

## 2019-07-24 DIAGNOSIS — T14.8XXA SPLINTER IN SKIN: Primary | ICD-10-CM

## 2019-07-24 PROCEDURE — 10120 INC&RMVL FB SUBQ TISS SMPL: CPT | Performed by: PHYSICIAN ASSISTANT

## 2019-07-24 ASSESSMENT — MIFFLIN-ST. JEOR: SCORE: 1491.81

## 2019-07-24 NOTE — PROGRESS NOTES
Salud Shea is a 44 year old male who presents to clinic today for the following health issues:    HPI   Splinter of Right Hand      Duration: 3 weeks    Description (location/character/radiation): Right palm of hand    Intensity:  mild, moderate    Accompanying signs and symptoms: Redness at site- swelling.    History (similar episodes/previous evaluation): None    Precipitating or alleviating factors: None    Therapies tried and outcome: None    Afebrile. Denies: drainage or bleeding from site     Ally DeShong CMA      He recalls specifically when getting this splinter stuck (while camping).  Ever since then it has been irritated but seems to be worsening and is not going away on it's own.      Patient Active Problem List   Diagnosis     Hyperlipidemia     Meniscus, medial, derangement     Achilles tendon tear     Other postprocedural status(V45.89)     Achilles tendon pain     Acute pain of left shoulder     Past Surgical History:   Procedure Laterality Date     ARTHROSCOPY KNEE WITH MEDIAL MENISCECTOMY  10/19/2011    Procedure:ARTHROSCOPY KNEE WITH MEDIAL MENISCECTOMY; Right Knee Arthroscopy with Medial Menisectomy--Anes Choice; Surgeon:SUMI BEAN; Location:[facility] OR     HERNIORRHAPHY HIATAL  1991     REPAIR TENDON ACHILLES  11/14/2011    Procedure:REPAIR TENDON ACHILLES; Left ankle achilles tendon rupure repair-popliteal block; Surgeon:KATINA STEIN; Location:[facility] OR       Social History     Tobacco Use     Smoking status: Never Smoker     Smokeless tobacco: Never Used   Substance Use Topics     Alcohol use: Yes     Comment: Monthly     Family History   Problem Relation Age of Onset     C.A.D. Maternal Grandfather      Circulatory Mother         DVT     Blood Disease Mother         Factor five      Hypertension Brother      Lipids Brother          No current outpatient medications on file.     BP Readings from Last 3 Encounters:   07/24/19 133/88   04/04/18 127/84   03/15/18 110/72    Wt  "Readings from Last 3 Encounters:   07/24/19 68.5 kg (151 lb)   04/04/18 67.8 kg (149 lb 6.4 oz)   03/15/18 67.9 kg (149 lb 11.2 oz)                      Reviewed and updated as needed this visit by Provider         Review of Systems   ROS COMP: Constitutional, HEENT, cardiovascular, pulmonary, gi and gu systems are negative, except as otherwise noted.      Objective    /88   Pulse 70   Temp 98.7  F (37.1  C) (Tympanic)   Resp 16   Ht 1.651 m (5' 5\")   Wt 68.5 kg (151 lb)   SpO2 98%   BMI 25.13 kg/m    Body mass index is 25.13 kg/m .  Physical Exam   GENERAL: healthy, alert and no distress  Skin:  Right palm with callous formation with a black dot in the middle.   Diagnostic Test Results:  none         Assessment & Plan     1. Splinter in skin  Procedure Note: splinter removal  Indication: foreign body    Verbal consent obtained following explanation of indications, risks, benefits, and alternative treatment.  The area was sterilized with betadine and overlying calloused tissue was removed with dermablade.  I was then able to get down further into the wound and grasp the tip of a small metal sliver (approximately 1.5 mm long).  No pus.  No remaining foreign body noted.       Patient tolerated the procedure well.  Wound care instructions were discussed with patient.  Patient was instructed to f/u if symptoms worsen or fail to improve as anticipated.     - REMOVE FOREIGN BODY SIMPLE     BMI:   Estimated body mass index is 25.13 kg/m  as calculated from the following:    Height as of this encounter: 1.651 m (5' 5\").    Weight as of this encounter: 68.5 kg (151 lb).               Return in about 1 week (around 7/31/2019) for a recheck if symptoms do not improve.    Chio Leblanc PA-C  [facility]      "

## 2019-10-18 ENCOUNTER — OFFICE VISIT (OUTPATIENT)
Dept: FAMILY MEDICINE | Facility: CLINIC | Age: 44
End: 2019-10-18
Payer: COMMERCIAL

## 2019-10-18 VITALS
RESPIRATION RATE: 14 BRPM | SYSTOLIC BLOOD PRESSURE: 120 MMHG | BODY MASS INDEX: 25.44 KG/M2 | HEIGHT: 65 IN | DIASTOLIC BLOOD PRESSURE: 80 MMHG | HEART RATE: 60 BPM | WEIGHT: 152.7 LBS | TEMPERATURE: 97.4 F

## 2019-10-18 DIAGNOSIS — K21.9 GASTROESOPHAGEAL REFLUX DISEASE, ESOPHAGITIS PRESENCE NOT SPECIFIED: Primary | ICD-10-CM

## 2019-10-18 DIAGNOSIS — E78.5 HYPERLIPIDEMIA, UNSPECIFIED HYPERLIPIDEMIA TYPE: ICD-10-CM

## 2019-10-18 DIAGNOSIS — L98.9 SKIN LESION OF CHEEK: ICD-10-CM

## 2019-10-18 PROCEDURE — 99214 OFFICE O/P EST MOD 30 MIN: CPT | Performed by: FAMILY MEDICINE

## 2019-10-18 RX ORDER — OMEPRAZOLE 20 MG/1
20 TABLET, DELAYED RELEASE ORAL DAILY
Qty: 90 TABLET | Refills: 0 | Status: SHIPPED | OUTPATIENT
Start: 2019-10-18 | End: 2021-12-02

## 2019-10-18 ASSESSMENT — MIFFLIN-ST. JEOR: SCORE: 1499.52

## 2019-10-18 NOTE — PROGRESS NOTES
SUBJECTIVE:                                                    Shamir Shea is a 44 year old male who presents to clinic today for the following health issues:    GERD/Heartburn  Onset: past few months     Description:     Burning in chest: YES    Intensity: moderate    Progression of Symptoms: improving    Accompanying Signs & Symptoms:  Does it feel like food gets stuck: no   Nausea: no  Vomiting (bloody?): no  Abdominal Pain: no  Black-Tarry stools: no:  Bloody stools: no    History:   Previous ulcers: no    Precipitating factors:   Caffeine use: YES-  Very low   Alcohol use: YES- very low   NSAID/Aspirin use: YES- occasional   Tobacco use: no  Worse with no particular food or drink.  Therapies Tried and outcome:none       Burning in the chest x several months  No history of heartburn  Worse after meals  Hasn't tried any meds for this   A couple time he felt it in the night and had a sour taste in the throat    Not exertional symptoms   No shortness of breath/diaphoresis/dizziness  No jaw/arm pain     Once a week ibuprofen     Brother with heartburn     Weight - slowly increased in last couple years  Body mass index is 25.41 kg/m .   Wt Readings from Last 10 Encounters:   10/18/19 69.3 kg (152 lb 11.2 oz)   07/24/19 68.5 kg (151 lb)   04/04/18 67.8 kg (149 lb 6.4 oz)   03/15/18 67.9 kg (149 lb 11.2 oz)   02/23/18 67.2 kg (148 lb 3.2 oz)   09/20/17 67.1 kg (147 lb 14.4 oz)   08/28/17 66.2 kg (146 lb)   09/08/16 67.1 kg (148 lb)   11/14/11 63.5 kg (140 lb)   11/11/11 68.9 kg (151 lb 12.8 oz)     Hypertension   Not on meds   BP Readings from Last 6 Encounters:   10/18/19 139/88   07/24/19 133/88   04/04/18 127/84   03/15/18 110/72   02/23/18 136/86   09/20/17 (!) 135/91     Due for fasting cholesterol     Father with CAD - at 74yo       2. Dark spot on face (left side)   New - just showed up in the last year   No itch/burn/bleed     Review of systems:  No f/c   No weight loss/night sweats  No n/v   No  "diarrhea/constipation   No abd pain  No dysuria/urgency/frequency   No black/tarry/bloody stools.  No rash     Problem list and histories reviewed & adjusted, as indicated.  Additional history: as documented     Patient Active Problem List   Diagnosis     Hyperlipidemia     Meniscus, medial, derangement     Achilles tendon tear     Other postprocedural status(V45.89)     Acute pain of left shoulder     Gastroesophageal reflux disease, esophagitis presence not specified     Current Outpatient Medications   Medication     Cholecalciferol (VITAMIN D PO)     Multiple Vitamins-Minerals (MULTIVITAMIN PO)     omeprazole (PRILOSEC OTC) 20 MG EC tablet     No current facility-administered medications for this visit.         Allergies   Allergen Reactions     Nkda [No Known Drug Allergies]          OBJECTIVE:                                                    /88 (BP Location: Right arm, Patient Position: Sitting, Cuff Size: Adult Regular)   Pulse 60   Temp 97.4  F (36.3  C) (Tympanic)   Resp 14   Ht 1.651 m (5' 5\")   Wt 69.3 kg (152 lb 11.2 oz)   BMI 25.41 kg/m   Body mass index is 25.41 kg/m .   GENERAL - Pt is alert and oriented in no acute distress.  Affect is appropriate. Good eye contact.  HEET - Head is normocephalic, atraumatic.    PERRLA,EEMI. Conjunctiva are free of icterus or erythema.    TMs bilaterally normal.   Oropharynx free of masses and lesions, no tonsillar exudate or petechiae.    NECK - Neck is supple w/o LA or thyromegaly  RESPIRATORY - Clear to auscultation bilaterally.  No wheezing noted  CV - RRR, no murmurs, rubs, gallops.   Skin - on the right cheek, just anterior to the ear, there is a macular heterogenous in color small macule with dark brown/black component and light brown component. It is approximately 3mm in diameter      ASSESSMENT/PLAN:                                                      (K21.9) Gastroesophageal reflux disease, esophagitis presence not specified  (primary " encounter diagnosis)  Comment: Discussed diagnosis and treatment. Lifestyle changes reviewed - info handout given. Start with daily ppi. If symptoms don't improve, schedule EGD.  The patient indicates understanding of these issues and agrees with the plan.   Plan: omeprazole (PRILOSEC OTC) 20 MG EC tablet            (L98.9) Skin lesion of cheek  Comment: given location, color, and newness, I think this should be reviewed by derm. The patient indicates understanding of these issues and agrees with the plan.   Plan: DERMATOLOGY REFERRAL            (E78.5) Hyperlipidemia, unspecified hyperlipidemia type  Comment: due for labs   Plan:     TIFFANIE Anaya MD   [facility]

## 2019-10-18 NOTE — PATIENT INSTRUCTIONS
Patient Education     Lifestyle Changes for Controlling GERD  When you have GERD, stomach acid feels as if it s backing up toward your mouth. Whether or not you take medicine to control your GERD, your symptoms can often be improved with lifestyle changes. Talk to your healthcare provider about the following suggestions. These suggestions may help you get relief from your symptoms.      Raise your head  Reflux is more likely to strike when you re lying down flat, because stomach fluid can flow backward more easily. Raising the head of your bed 4 to 6 inches can help. To do this:    Slide blocks or books under the legs at the head of your bed. Or, place a wedge under the mattress. Many [employer] can make a suitable wedge for you. The wedge should run from your waist to the top of your head.    Don t just prop your head on several pillows. This increases pressure on your stomach. It can make GERD worse.  Watch your eating habits  Certain foods may increase the acid in your stomach or relax the lower esophageal sphincter. This makes GERD more likely. It s best to avoid the following if they cause you symptoms:    Coffee, tea, and carbonated drinks (with and without caffeine)    Fatty, fried, or spicy food    Mint, chocolate, onions, and tomatoes    Peppermint    Any other foods that seem to irritate your stomach or cause you pain  Relieve the pressure  Tips include the following:    Eat smaller meals, even if you have to eat more often.    Don t lie down right after you eat. Wait a few hours for your stomach to empty.    Avoid tight belts and tight-fitting clothes.    Lose excess weight.  Tobacco and alcohol  Avoid smoking tobacco and drinking alcohol. They can make GERD symptoms worse.  Date Last Reviewed: 7/1/2016 2000-2018 [employer]. [address] [place] [address]. All rights reserved. This information is not intended as a substitute for professional medical care. Always follow your  healthcare professional's instructions.           Patient Education     What Is GERD?     With GERD, the weak LES allows food and fluids to travel back, or reflux, into the esophagus.      If you often have a painful burning feeling in your chest after you eat, you may have gastroesophageal reflux disease (GERD). Heartburn that keeps coming back is a classic symptom of GERD. But you may have other symptoms as well. A GERD diagnosis is made only after a complete evaluation by your healthcare provider.  Note: Chest pain may also be caused by heart problems. Be sure to have all chest pain evaluated by a healthcare provider.   When you have a reflux problem  After you eat, food travels from your mouth down the esophagus to your stomach. Along the way, food passes through a one-way valve called the lower esophageal sphincter (LES). The LES sits at the opening to your stomach. Normally the LES opens when you swallow. It lets food enter the stomach, then closes quickly. With GERD, the LES doesn t work normally. It lets food and stomach acid flow back (reflux) into the esophagus.  Some common symptoms    Frequent heartburn or burping    Sour-tasting fluid backing up into your mouth    Symptoms that get worse after you eat, bend over, or lie down    Trouble swallowing or pain when swallowing    A dry, long-term (chronic) cough    Upset stomach (nausea) or vomiting  Relieving your discomfort  You and your healthcare provider can work together to find the treatment options that best ease your symptoms. These may include lifestyle changes, medicine, and possibly surgery.  Many people find their GERD symptoms decrease when they eat small frequent meals instead of 3 large ones. Reducing the amount of fatty foods in your diet will also help.   The following foods tend to cause problems for people diagnosed with GERD:    Tomatoes and tomato products    Alcohol    Coffee    Peppermint    Greasy or spicy foods  Talk with your  provider if you don t understand how to make the dietary changes needed to control your GERD symptoms. Your provider can refer you to a nutritionist.  Date Last Reviewed: 7/1/2016 2000-2018 [employer]. [address] [place] [address], [place] [address]. All rights reserved. This information is not intended as a substitute for professional medical care. Always follow your healthcare professional's instructions.

## 2020-02-19 ENCOUNTER — OFFICE VISIT (OUTPATIENT)
Dept: DERMATOLOGY | Facility: CLINIC | Age: 45
End: 2020-02-19
Payer: COMMERCIAL

## 2020-02-19 VITALS — DIASTOLIC BLOOD PRESSURE: 80 MMHG | HEART RATE: 68 BPM | SYSTOLIC BLOOD PRESSURE: 134 MMHG | OXYGEN SATURATION: 97 %

## 2020-02-19 DIAGNOSIS — D18.01 CHERRY ANGIOMA: ICD-10-CM

## 2020-02-19 DIAGNOSIS — D48.5 NEOPLASM OF UNCERTAIN BEHAVIOR OF SKIN: Primary | ICD-10-CM

## 2020-02-19 DIAGNOSIS — L82.1 SEBORRHEIC KERATOSIS: ICD-10-CM

## 2020-02-19 DIAGNOSIS — L81.4 LENTIGO: ICD-10-CM

## 2020-02-19 DIAGNOSIS — L91.8 INFLAMED SKIN TAG: ICD-10-CM

## 2020-02-19 DIAGNOSIS — D22.9 MULTIPLE BENIGN NEVI: ICD-10-CM

## 2020-02-19 PROCEDURE — 88305 TISSUE EXAM BY PATHOLOGIST: CPT | Mod: TC | Performed by: PHYSICIAN ASSISTANT

## 2020-02-19 PROCEDURE — 99213 OFFICE O/P EST LOW 20 MIN: CPT | Mod: 25 | Performed by: PHYSICIAN ASSISTANT

## 2020-02-19 PROCEDURE — 11200 RMVL SKIN TAGS UP TO&INC 15: CPT | Mod: 59 | Performed by: PHYSICIAN ASSISTANT

## 2020-02-19 PROCEDURE — 11102 TANGNTL BX SKIN SINGLE LES: CPT | Performed by: PHYSICIAN ASSISTANT

## 2020-02-19 NOTE — LETTER
2/19/2020         RE: Shamir Shea  [address] [place] [address]        Dear Colleague,    Thank you for referring your patient, Shamir Shea, to the [facility]. Please see a copy of my visit note below.    Shamir Shea is a 44 year old year old male patient here today for spot on sideburn. Present since this summer. Denies any pain or bleeding. He notes he is outside often and typically uses sunscreen. Has had one blistering sunburn. Patient has no other skin complaints today.  Remainder of the HPI, Meds, PMH, Allergies, FH, and SH was reviewed in chart.    Pertinent Hx:   No personal or family history of skin cancer.   Past Medical History:   Diagnosis Date     PONV (postoperative nausea and vomiting)        Past Surgical History:   Procedure Laterality Date     ARTHROSCOPY KNEE WITH MEDIAL MENISCECTOMY  10/19/2011    Procedure:ARTHROSCOPY KNEE WITH MEDIAL MENISCECTOMY; Right Knee Arthroscopy with Medial Menisectomy--Anes Choice; Surgeon:SUMI BEAN; Location:[place] OR     HERNIORRHAPHY HIATAL  1991     REPAIR TENDON ACHILLES  11/14/2011    Procedure:REPAIR TENDON ACHILLES; Left ankle achilles tendon rupure repair-popliteal block; Surgeon:KATINA STEIN; Location:[place] OR        Family History   Problem Relation Age of Onset     C.A.D. Maternal Grandfather      Circulatory Mother         DVT     Blood Disease Mother         Factor five      Hypertension Brother      Lipids Brother        Social History     Socioeconomic History     Marital status:      Spouse name: Mesha Shea     Number of children: 2     Years of education: 16     Highest education level: Not on file   Occupational History     Occupation: Senior Storage Engineer     Employer: [employer]   Social Needs     Financial resource strain: Not on file     Food insecurity:     Worry: Not on file     Inability: Not on file     Transportation needs:     Medical: Not on file     Non-medical: Not on file    Tobacco Use     Smoking status: Never Smoker     Smokeless tobacco: Never Used   Substance and Sexual Activity     Alcohol use: Yes     Comment: Monthly     Drug use: No     Sexual activity: Yes     Partners: Female     Birth control/protection: Surgical     Comment: Vastectomy   Lifestyle     Physical activity:     Days per week: Not on file     Minutes per session: Not on file     Stress: Not on file   Relationships     Social connections:     Talks on phone: Not on file     Gets together: Not on file     Attends Confucianism service: Not on file     Active member of club or organization: Not on file     Attends meetings of clubs or organizations: Not on file     Relationship status: Not on file     Intimate partner violence:     Fear of current or ex partner: Not on file     Emotionally abused: Not on file     Physically abused: Not on file     Forced sexual activity: Not on file   Other Topics Concern     Parent/sibling w/ CABG, MI or angioplasty before 65F 55M? No   Social History Narrative     Not on file       Outpatient Encounter Medications as of 2/19/2020   Medication Sig Dispense Refill     Cholecalciferol (VITAMIN D PO)        Multiple Vitamins-Minerals (MULTIVITAMIN PO)        omeprazole (PRILOSEC OTC) 20 MG EC tablet Take 1 tablet (20 mg) by mouth daily (Patient not taking: Reported on 2/19/2020) 90 tablet 0     No facility-administered encounter medications on file as of 2/19/2020.              Review Of Systems  Skin: As above  Eyes: negative  Ears/Nose/Throat: negative  Respiratory: No shortness of breath, dyspnea on exertion, cough, or hemoptysis  Cardiovascular: negative  Gastrointestinal: negative  Genitourinary: negative  Musculoskeletal: negative  Neurologic: negative  Psychiatric: negative  Hematologic/Lymphatic/Immunologic: negative  Endocrine: negative      O:   NAD, WDWN, Alert & Oriented, Mood & Affect wnl, Vitals stable   Here today alone   /80   Pulse 68   SpO2 97%    General  appearance normal   Vitals stable   Alert, oriented and in no acute distress     0.2 cm brown irregular thin papule on right sideburn  Stuck on papules and brown macules on trunk and ext   Red papules on trunk  Brown papules and macules with regular pigment network and border on torso and extremities     The remainder of skin exam is normal       Eyes: Conjunctivae/lids:Normal     ENT: Lips: normal    MSK:Normal    Cardiovascular: peripheral edema none    Pulm: Breathing Normal    Neuro/Psych: Orientation:Alert and Orientedx3 ; Mood/Affect:normal     A/P:  1. R/O MIS vs pigmented sk on right sideburn  TANGENTIAL BIOPSY SENT OUT:  After consent, anesthesia with LEC and prep, tangential excision performed and specimen sent out for permanent section histology.  No complications and routine wound care. Patient told to call our office in 1-2 weeks for result.      2. Inflamed skin tag on right groin x 1  After consent, tangential excision performedNo complications and routine wound care.   3. Seborrheic keratosis, lentigo, angioma, benign nevi   BENIGN LESIONS DISCUSSED WITH PATIENT:  I discussed the specifics of tumor, prognosis, and genetics of benign lesions.  I explained that treatment of these lesions would be purely cosmetic and not medically neccessary.  I discussed with patient different removal options including excision, cautery and /or laser.      Nature and genetics of benign skin lesions dicussed with patient.  Signs and Symptoms of skin cancer discussed with patient.  ABCDEs of melanoma reviewed with patient.  Patient encouraged to perform monthly skin exams.  UV precautions reviewed with patient.  Risks of non-melanoma skin cancer discussed with patient   Return to clinic pending biopsy results.       Again, thank you for allowing me to participate in the care of your patient.        Sincerely,        Meagan Ramirez PA-C

## 2020-02-19 NOTE — PATIENT INSTRUCTIONS
Wound Care Instructions     FOR SUPERFICIAL WOUNDS     [facility] [phone]    [facility] [phone]                       AFTER 24 HOURS YOU SHOULD REMOVE THE BANDAGE AND BEGIN DAILY DRESSING CHANGES AS FOLLOWS:     1) Remove Dressing.     2) Clean and dry the area with tap water using a Q-tip or sterile gauze pad.     3) Apply Vaseline, Aquaphor, Polysporin ointment or Bacitracin ointment over entire wound.  Do NOT use Neosporin ointment.     4) Cover the wound with a band-aid, or a sterile non-stick gauze pad and micropore paper tape      REPEAT THESE INSTRUCTIONS AT LEAST ONCE A DAY UNTIL THE WOUND HAS COMPLETELY HEALED.    It is an old wives tale that a wound heals better when it is exposed to air and allowed to dry out. The wound will heal faster with a better cosmetic result if it is kept moist with ointment and covered with a bandage.    **Do not let the wound dry out.**      Supplies Needed:      *Cotton tipped applicators (Q-tips)    *Polysporin Ointment or Bacitracin Ointment (NOT NEOSPORIN)    *Band-aids or non-stick gauze pads and micropore paper tape.      PATIENT INFORMATION:    During the healing process you will notice a number of changes. All wounds develop a small halo of redness surrounding the wound.  This means healing is occurring. Severe itching with extensive redness usually indicates sensitivity to the ointment or bandage tape used to dress the wound.  You should call our office if this develops.      Swelling  and/or discoloration around your surgical site is common, particularly when performed around the eye.    All wounds normally drain.  The larger the wound the more drainage there will be.  After 7-10 days, you will notice the wound beginning to shrink and new skin will begin to grow.  The wound is healed when you can see skin has formed over the entire area.  A healed wound has a healthy, shiny look to the surface and is red to dark pink in color  to normalize.  Wounds may take approximately 4-6 weeks to heal.  Larger wounds may take 6-8 weeks.  After the wound is healed you may discontinue dressing changes.    You may experience a sensation of tightness as your wound heals. This is normal and will gradually subside.    Your healed wound may be sensitive to temperature changes. This sensitivity improves with time, but if you re having a lot of discomfort, try to avoid temperature extremes.    Patients frequently experience itching after their wound appears to have healed because of the continue healing under the skin.  Plain Vaseline will help relieve the itching.        POSSIBLE COMPLICATIONS    BLEEDIN. Leave the bandage in place.  2. Use tightly rolled up gauze or a cloth to apply direct pressure over the bandage for 30  minutes.  3. Reapply pressure for an additional 30 minutes if necessary  4. Use additional gauze and tape to maintain pressure once the bleeding has stopped.

## 2020-02-19 NOTE — PROGRESS NOTES
Shamir Shea is a 44 year old year old male patient here today for spot on sideburn. Present since this summer. Denies any pain or bleeding. He notes he is outside often and typically uses sunscreen. Has had one blistering sunburn. Patient has no other skin complaints today.  Remainder of the HPI, Meds, PMH, Allergies, FH, and SH was reviewed in chart.    Pertinent Hx:   No personal or family history of skin cancer.   Past Medical History:   Diagnosis Date     PONV (postoperative nausea and vomiting)        Past Surgical History:   Procedure Laterality Date     ARTHROSCOPY KNEE WITH MEDIAL MENISCECTOMY  10/19/2011    Procedure:ARTHROSCOPY KNEE WITH MEDIAL MENISCECTOMY; Right Knee Arthroscopy with Medial Menisectomy--Anes Choice; Surgeon:SUMI BEAN; Location:[place]     HERNIORRHAPHY HIATAL 1991     REPAIR TENDON ACHILLES  11/14/2011    Procedure:REPAIR TENDON ACHILLES; Left ankle achilles tendon rupure repair-popliteal block; Surgeon:KATINA STEIN; Location:[place]        Family History   Problem Relation Age of Onset     C.A.D. Maternal Grandfather      Circulatory Mother         DVT     Blood Disease Mother         Factor five      Hypertension Brother      Lipids Brother        Social History     Socioeconomic History     Marital status:      Spouse name: Mesha Shea     Number of children: 2     Years of education: 16     Highest education level: Not on file   Occupational History     Occupation:      Employer: [employer]   Social Needs     Financial resource strain: Not on file     Food insecurity:     Worry: Not on file     Inability: Not on file     Transportation needs:     Medical: Not on file     Non-medical: Not on file   Tobacco Use     Smoking status: Never Smoker     Smokeless tobacco: Never Used   Substance and Sexual Activity     Alcohol use: Yes     Comment: Monthly     Drug use: No     Sexual activity: Yes     Partners: Female     Birth  control/protection: Surgical     Comment: Vastectomy   Lifestyle     Physical activity:     Days per week: Not on file     Minutes per session: Not on file     Stress: Not on file   Relationships     Social connections:     Talks on phone: Not on file     Gets together: Not on file     Attends [employer] service: Not on file     Active member of club or organization: Not on file     Attends meetings of clubs or organizations: Not on file     Relationship status: Not on file     Intimate partner violence:     Fear of current or ex partner: Not on file     Emotionally abused: Not on file     Physically abused: Not on file     Forced sexual activity: Not on file   Other Topics Concern     Parent/sibling w/ CABG, MI or angioplasty before 65F 55M? No   Social History Narrative     Not on file       Outpatient Encounter Medications as of 2/19/2020   Medication Sig Dispense Refill     Cholecalciferol (VITAMIN D PO)        Multiple Vitamins-Minerals (MULTIVITAMIN PO)        omeprazole (PRILOSEC OTC) 20 MG EC tablet Take 1 tablet (20 mg) by mouth daily (Patient not taking: Reported on 2/19/2020) 90 tablet 0     No facility-administered encounter medications on file as of 2/19/2020.              Review Of Systems  Skin: As above  Eyes: negative  Ears/Nose/Throat: negative  Respiratory: No shortness of breath, dyspnea on exertion, cough, or hemoptysis  Cardiovascular: negative  Gastrointestinal: negative  Genitourinary: negative  Musculoskeletal: negative  Neurologic: negative  Psychiatric: negative  Hematologic/Lymphatic/Immunologic: negative  Endocrine: negative      O:   NAD, WDWN, Alert & Oriented, Mood & Affect wnl, Vitals stable   Here today alone   /80   Pulse 68   SpO2 97%    General appearance normal   Vitals stable   Alert, oriented and in no acute distress     0.2 cm brown irregular thin papule on right sideburn  Stuck on papules and brown macules on trunk and ext   Red papules on trunk  Brown papules and  macules with regular pigment network and border on torso and extremities     The remainder of skin exam is normal       Eyes: Conjunctivae/lids:Normal     ENT: Lips: normal    MSK:Normal    Cardiovascular: peripheral edema none    Pulm: Breathing Normal    Neuro/Psych: Orientation:Alert and Orientedx3 ; Mood/Affect:normal     A/P:  1. R/O MIS vs pigmented sk on right sideburn  TANGENTIAL BIOPSY SENT OUT:  After consent, anesthesia with LEC and prep, tangential excision performed and specimen sent out for permanent section histology.  No complications and routine wound care. Patient told to call our office in 1-2 weeks for result.      2. Inflamed skin tag on right groin x 1  After consent, tangential excision performedNo complications and routine wound care.   3. Seborrheic keratosis, lentigo, angioma, benign nevi   BENIGN LESIONS DISCUSSED WITH PATIENT:  I discussed the specifics of tumor, prognosis, and genetics of benign lesions.  I explained that treatment of these lesions would be purely cosmetic and not medically neccessary.  I discussed with patient different removal options including excision, cautery and /or laser.      Nature and genetics of benign skin lesions dicussed with patient.  Signs and Symptoms of skin cancer discussed with patient.  ABCDEs of melanoma reviewed with patient.  Patient encouraged to perform monthly skin exams.  UV precautions reviewed with patient.  Risks of non-melanoma skin cancer discussed with patient   Return to clinic pending biopsy results.

## 2020-02-21 LAB — COPATH REPORT: NORMAL

## 2020-11-29 ENCOUNTER — HEALTH MAINTENANCE LETTER (OUTPATIENT)
Age: 45
End: 2020-11-29

## 2021-12-02 ENCOUNTER — OFFICE VISIT (OUTPATIENT)
Dept: FAMILY MEDICINE | Facility: CLINIC | Age: 46
End: 2021-12-02
Payer: COMMERCIAL

## 2021-12-02 VITALS
DIASTOLIC BLOOD PRESSURE: 82 MMHG | HEIGHT: 65 IN | BODY MASS INDEX: 26.04 KG/M2 | OXYGEN SATURATION: 97 % | SYSTOLIC BLOOD PRESSURE: 128 MMHG | WEIGHT: 156.3 LBS | HEART RATE: 80 BPM

## 2021-12-02 DIAGNOSIS — R07.89 OTHER CHEST PAIN: ICD-10-CM

## 2021-12-02 DIAGNOSIS — R00.2 PALPITATIONS: Primary | ICD-10-CM

## 2021-12-02 DIAGNOSIS — E78.5 HYPERLIPIDEMIA, UNSPECIFIED HYPERLIPIDEMIA TYPE: ICD-10-CM

## 2021-12-02 PROCEDURE — 99214 OFFICE O/P EST MOD 30 MIN: CPT | Performed by: FAMILY MEDICINE

## 2021-12-02 RX ORDER — FAMOTIDINE 40 MG/1
40 TABLET, FILM COATED ORAL DAILY
Qty: 30 TABLET | Refills: 0 | Status: SHIPPED | OUTPATIENT
Start: 2021-12-02 | End: 2022-01-18

## 2021-12-02 ASSESSMENT — MIFFLIN-ST. JEOR: SCORE: 1505.85

## 2021-12-02 NOTE — PROGRESS NOTES
Shamir was seen today for chest discomfort x 4 or 6 weeks ago.    Diagnoses and all orders for this visit:    Palpitations - I think could be secondary to stress but r/o low magnesium / thyroid disease. If persistent, schedule event monitor to evaluate.  -     Magnesium RBC; Future  -     TSH with free T4 reflex; Future    Other chest pain - I think he has a flare -up of reflux.  Likely related to poor eating habits/ stress.  Start H2 blocker and gave pt guidelines to improve digestion.  Discussed stress reduction.  -     Comprehensive metabolic panel (BMP + Alb, Alk Phos, ALT, AST, Total. Bili, TP); Future  -     C-Reactive Protein, High Sensitivity; Future  -     famotidine (PEPCID) 40 MG tablet; Take 1 tablet (40 mg) by mouth daily    Hyperlipidemia, unspecified hyperlipidemia type - Check fasting lipids/ CRP.  If he appears to be high risk consider coronary calcium scan screening.  -     Lipid panel reflex to direct LDL Fasting; Future      Patient Instructions   To improve your digestion:    No not drink fluids 20 minutes before and for 20 minutes after major meals.  Excess fluid with meals will dilute your digestive enzymes and cold drinks with meals will slow down the digestive reaction.    Consider taking digestive bitters with meals or starting meals with a salad of bitter greens like arugula  ([employer]- can purchase at co-ops).  Bitter foods help stimulate digestion.    Eat mindfully and slowly.  Be aware of your mental state - Digestion shuts down when you are in a stressed state.    Try to leave a 4 hour window between meals and stop eating 3 hours before bedtime.  Your body needs time to process the food you eat and move it through the digestive tract using the migrating motor complex which creates waves of activity after eating - kind of a housekeeping action.      Add bryan to your food or drink bryan tea to help with digestion.    Start on famotidine 40 mg daily    I will contact you with  "test results    Look into ways to relieve stress - hiking / walking dog and consider meeting with     Update me with message on My Chart in 1 week.    If palpitations continue, we can check an event monitor      SUBJECTIVE: Shamir Shea is a 46 year old male who presents with the following:  Patient presents with:  Chest discomfort x 4 or 6 weeks ago: No pain just discomfort, tightness, slightly heartburns, and irregular heart beat/SOB at times.   He started noticing slight burning in chest and chest achiness/ pressure - 3/10 intensity.  Occurs daily.  Not present in am. Feels it during the day.  No radiation of pain.  Stress may aggravate symptoms.  Not sure if related to eating.  No nausea/ bloating / abdominal pain/ normal stools.  Has not taken any meds for this pain.  When he has the achiness he has slight shortness of breath.  No symptoms with exercise.      On weekend will get up early and lie on cough and he will feel heart is racing / pronounced beat.  Lasts for 1-2 minutes and goes away.  Has occurred about 6 times.  No chest pain- possible shortness of breath.  No sweating.    ROS: Left flank pain.  No h/o COVID / asthma / smoking.  SH: [place] with wife and 15 yo.  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  Work has been very stressful.    Lifestyle: Caffeine - one can diet coke / day at lunch.  Alcohol - once in month.  Diet- 3 meals/ day.  Meals vary - [employer] vs home cooked.  Exercise - 2-3x a week. Weight lifting/ cardio.  Takes dog for walks.  Has been exercising a lot less due to working at home.    FH: Father 75 with ASCVD      OBJECTIVE: /82 (BP Location: Left arm, Patient Position: Sitting, Cuff Size: Adult Regular)   Pulse 80   Ht 1.651 m (5' 5\")   Wt 70.9 kg (156 lb 4.8 oz)   SpO2 97%   BMI 26.01 kg/m     No acute distress.  HEENT: Head is atraumatic and/normocephalic.  PERRL.  Conjunctiva clear.  Tympanic membranes grey with normal landmarks and normal " light reflexes.  No nasal discharge.  Oropharynx is pink and moist.  Sinuses nontender.  Neck: Supple.  No lymphadenopathy or thyromegaly.  Lungs: Clear to auscultation.  No wheezing, retractions, or tachypnea.  Heart: RRR. S1 and S2 normal.  No murmurs, rubs, or gallops.  Abdomen: Soft. NT. ND. No HSM or masses.  Chest: no chest wall tenderness  Neuro: Awake, alert, oriented x 3.  Normal strength and tone.  Normal gait.   Psych: He appears anxious/ stressed.    Total time spent: 40 minutes face to face/ reviewing plan of care / discussing stress and it's effects on the body and reviewing ways to decrease stress/ documentation.    Martina Cheung MD

## 2021-12-02 NOTE — PATIENT INSTRUCTIONS
To improve your digestion:    No not drink fluids 20 minutes before and for 20 minutes after major meals.  Excess fluid with meals will dilute your digestive enzymes and cold drinks with meals will slow down the digestive reaction.    Consider taking digestive bitters with meals or starting meals with a salad of bitter greens like arugula  ([facility]- can purchase at co-ops).  Bitter foods help stimulate digestion.    Eat mindfully and slowly.  Be aware of your mental state - Digestion shuts down when you are in a stressed state.    Try to leave a 4 hour window between meals and stop eating 3 hours before bedtime.  Your body needs time to process the food you eat and move it through the digestive tract using the migrating motor complex which creates waves of activity after eating - kind of a housekeeping action.      Add bryan to your food or drink bryan tea to help with digestion.    Start on famotidine 40 mg daily    I will contact you with test results    Look into ways to relieve stress - hiking / walking dog and consider meeting with     Update me with message on My Chart in 1 week.    If palpitations continue, we can check an event monitor

## 2021-12-10 ENCOUNTER — LAB (OUTPATIENT)
Dept: LAB | Facility: CLINIC | Age: 46
End: 2021-12-10
Payer: COMMERCIAL

## 2021-12-10 DIAGNOSIS — E78.5 HYPERLIPIDEMIA, UNSPECIFIED HYPERLIPIDEMIA TYPE: ICD-10-CM

## 2021-12-10 DIAGNOSIS — R07.89 OTHER CHEST PAIN: ICD-10-CM

## 2021-12-10 DIAGNOSIS — R00.2 PALPITATIONS: ICD-10-CM

## 2021-12-10 LAB
ALBUMIN SERPL-MCNC: 4.2 G/DL (ref 3.5–5)
ALP SERPL-CCNC: 83 U/L (ref 45–120)
ALT SERPL W P-5'-P-CCNC: 49 U/L (ref 0–45)
ANION GAP SERPL CALCULATED.3IONS-SCNC: 10 MMOL/L (ref 5–18)
AST SERPL W P-5'-P-CCNC: 31 U/L (ref 0–40)
BILIRUB SERPL-MCNC: 0.6 MG/DL (ref 0–1)
BUN SERPL-MCNC: 16 MG/DL (ref 8–22)
CALCIUM SERPL-MCNC: 10.1 MG/DL (ref 8.5–10.5)
CHLORIDE BLD-SCNC: 104 MMOL/L (ref 98–107)
CHOLEST SERPL-MCNC: 248 MG/DL
CO2 SERPL-SCNC: 26 MMOL/L (ref 22–31)
CREAT SERPL-MCNC: 0.97 MG/DL (ref 0.7–1.3)
CRP SERPL HS-MCNC: 3.6 MG/L (ref 0–3)
FASTING STATUS PATIENT QL REPORTED: YES
GFR SERPL CREATININE-BSD FRML MDRD: >90 ML/MIN/1.73M2
GLUCOSE BLD-MCNC: 93 MG/DL (ref 70–125)
HDLC SERPL-MCNC: 57 MG/DL
LDLC SERPL CALC-MCNC: 159 MG/DL
POTASSIUM BLD-SCNC: 5 MMOL/L (ref 3.5–5)
PROT SERPL-MCNC: 7.7 G/DL (ref 6–8)
SODIUM SERPL-SCNC: 140 MMOL/L (ref 136–145)
TRIGL SERPL-MCNC: 158 MG/DL
TSH SERPL DL<=0.005 MIU/L-ACNC: 1.4 UIU/ML (ref 0.3–5)

## 2021-12-10 PROCEDURE — 84443 ASSAY THYROID STIM HORMONE: CPT

## 2021-12-10 PROCEDURE — 80061 LIPID PANEL: CPT

## 2021-12-10 PROCEDURE — 80053 COMPREHEN METABOLIC PANEL: CPT

## 2021-12-10 PROCEDURE — 86141 C-REACTIVE PROTEIN HS: CPT

## 2021-12-10 PROCEDURE — 36415 COLL VENOUS BLD VENIPUNCTURE: CPT

## 2021-12-10 PROCEDURE — 99000 SPECIMEN HANDLING OFFICE-LAB: CPT

## 2021-12-10 PROCEDURE — 83735 ASSAY OF MAGNESIUM: CPT | Mod: 90

## 2021-12-15 LAB — MAGNESIUM RBC-SCNC: 5.3 MG/DL

## 2022-01-12 DIAGNOSIS — R07.89 OTHER CHEST PAIN: ICD-10-CM

## 2022-01-16 NOTE — TELEPHONE ENCOUNTER
"Last Written Prescription Date:  12/2/21  Last Fill Quantity: 30,  # refills: 0   Last office visit provider:  12/2/21     Routing refill request to provider for review/approval because:  NO PCP          Requested Prescriptions   Pending Prescriptions Disp Refills     famotidine (PEPCID) 40 MG tablet [Pharmacy Med Name: FAMOTIDINE 40MG TABS] 30 tablet 0     Sig: TAKE ONE TABLET BY MOUTH ONCE DAILY       H2 Blockers Protocol Passed - 1/12/2022  2:34 PM        Passed - Patient is age 12 or older        Passed - Recent (12 mo) or future (30 days) visit within the authorizing provider's specialty     Patient has had an office visit with the authorizing provider or a provider within the authorizing providers department within the previous 12 mos or has a future within next 30 days. See \"Patient Info\" tab in inbasket, or \"Choose Columns\" in Meds & Orders section of the refill encounter.              Passed - Medication is active on med list             Nadia Allen RN 01/15/22 7:30 PM  "

## 2022-01-18 RX ORDER — FAMOTIDINE 40 MG/1
TABLET, FILM COATED ORAL
Qty: 30 TABLET | Refills: 0 | Status: SHIPPED | OUTPATIENT
Start: 2022-01-18 | End: 2022-01-30

## 2022-01-30 ENCOUNTER — MYC REFILL (OUTPATIENT)
Dept: FAMILY MEDICINE | Facility: CLINIC | Age: 47
End: 2022-01-30
Payer: COMMERCIAL

## 2022-01-30 DIAGNOSIS — R07.89 OTHER CHEST PAIN: ICD-10-CM

## 2022-02-01 RX ORDER — FAMOTIDINE 40 MG/1
40 TABLET, FILM COATED ORAL DAILY
Qty: 90 TABLET | Refills: 0 | Status: SHIPPED | OUTPATIENT
Start: 2022-02-01

## 2022-02-01 NOTE — TELEPHONE ENCOUNTER
"Former patient of [facility] & has not established care with another provider.  Please assign refill request to covering provider per clinic standard process.    Routing refill request to provider for review/approval because:  Early refill requested.  No PCP    Last Written Prescription Date:  1/18/22  Last Fill Quantity: 30,  # refills: 0   Last office visit provider:  12/2/21     Requested Prescriptions   Pending Prescriptions Disp Refills     famotidine (PEPCID) 40 MG tablet 30 tablet 0     Sig: Take 1 tablet (40 mg) by mouth daily       H2 Blockers Protocol Passed - 1/30/2022  7:10 PM        Passed - Patient is age 12 or older        Passed - Recent (12 mo) or future (30 days) visit within the authorizing provider's specialty     Patient has had an office visit with the authorizing provider or a provider within the authorizing providers department within the previous 12 mos or has a future within next 30 days. See \"Patient Info\" tab in inbasket, or \"Choose Columns\" in Meds & Orders section of the refill encounter.              Passed - Medication is active on med list             Mao Plata RN 02/01/22 1:14 PM  "

## 2022-02-17 PROBLEM — K21.9 GASTROESOPHAGEAL REFLUX DISEASE: Status: ACTIVE | Noted: 2019-10-18

## 2022-10-12 ENCOUNTER — OFFICE VISIT (OUTPATIENT)
Dept: FAMILY MEDICINE | Facility: CLINIC | Age: 47
End: 2022-10-12
Payer: COMMERCIAL

## 2022-10-12 VITALS
DIASTOLIC BLOOD PRESSURE: 80 MMHG | OXYGEN SATURATION: 97 % | RESPIRATION RATE: 16 BRPM | HEART RATE: 76 BPM | SYSTOLIC BLOOD PRESSURE: 121 MMHG | WEIGHT: 149.1 LBS | TEMPERATURE: 98.7 F | BODY MASS INDEX: 23.96 KG/M2 | HEIGHT: 66 IN

## 2022-10-12 DIAGNOSIS — Z13.220 SCREENING FOR LIPOID DISORDERS: ICD-10-CM

## 2022-10-12 DIAGNOSIS — Z11.4 SCREENING FOR HIV (HUMAN IMMUNODEFICIENCY VIRUS): ICD-10-CM

## 2022-10-12 DIAGNOSIS — K21.9 GASTROESOPHAGEAL REFLUX DISEASE WITHOUT ESOPHAGITIS: ICD-10-CM

## 2022-10-12 DIAGNOSIS — Z11.59 NEED FOR HEPATITIS C SCREENING TEST: ICD-10-CM

## 2022-10-12 DIAGNOSIS — R74.8 ELEVATED LIVER ENZYMES: ICD-10-CM

## 2022-10-12 DIAGNOSIS — Z00.00 ROUTINE GENERAL MEDICAL EXAMINATION AT A HEALTH CARE FACILITY: Primary | ICD-10-CM

## 2022-10-12 DIAGNOSIS — H00.019 HORDEOLUM EXTERNUM, UNSPECIFIED LATERALITY: ICD-10-CM

## 2022-10-12 DIAGNOSIS — Z12.11 SCREEN FOR COLON CANCER: ICD-10-CM

## 2022-10-12 PROCEDURE — 90715 TDAP VACCINE 7 YRS/> IM: CPT | Performed by: PHYSICIAN ASSISTANT

## 2022-10-12 PROCEDURE — 90471 IMMUNIZATION ADMIN: CPT | Performed by: PHYSICIAN ASSISTANT

## 2022-10-12 PROCEDURE — 99396 PREV VISIT EST AGE 40-64: CPT | Mod: 25 | Performed by: PHYSICIAN ASSISTANT

## 2022-10-12 RX ORDER — ERYTHROMYCIN 5 MG/G
0.5 OINTMENT OPHTHALMIC 3 TIMES DAILY
Qty: 10.5 G | Refills: 0 | Status: SHIPPED | OUTPATIENT
Start: 2022-10-12 | End: 2022-10-19

## 2022-10-12 RX ORDER — OMEGA-3 FATTY ACIDS/FISH OIL 300-1000MG
CAPSULE ORAL
COMMUNITY
End: 2024-01-05

## 2022-10-12 RX ORDER — MULTIPLE VITAMINS W/ MINERALS TAB 9MG-400MCG
1 TAB ORAL DAILY
COMMUNITY

## 2022-10-12 ASSESSMENT — ENCOUNTER SYMPTOMS
SORE THROAT: 0
NAUSEA: 0
DYSURIA: 0
FREQUENCY: 0
EYE PAIN: 1
NERVOUS/ANXIOUS: 0
PALPITATIONS: 0
DIARRHEA: 0
HEMATOCHEZIA: 0
PARESTHESIAS: 0
ABDOMINAL PAIN: 0
DIZZINESS: 0
CONSTIPATION: 0
HEMATURIA: 0
ARTHRALGIAS: 0
COUGH: 0
MYALGIAS: 0
HEADACHES: 0
FEVER: 0
WEAKNESS: 0
SHORTNESS OF BREATH: 0
JOINT SWELLING: 0
CHILLS: 0
HEARTBURN: 0

## 2022-10-12 NOTE — PATIENT INSTRUCTIONS
Schedule fasting labwork - lab only visit    Schedule eye doctor appointment for within 1 week  Continue warm compresses. Antibiotic eye ointment.    Preventive Health Recommendations  Male Ages 40 to 49    Yearly exam:             See your health care provider every year in order to  o   Review health changes.   o   Discuss preventive care.    o   Review your medicines if your doctor has prescribed any.  You should be tested each year for STDs (sexually transmitted diseases) if you re at risk.   Have a cholesterol test every 5 years.   Have a colonoscopy (test for colon cancer) if someone in your family has had colon cancer or polyps before age 50.   After age 45, have a diabetes test (fasting glucose). If you are at risk for diabetes, you should have this test every 3 years.    Talk with your health care provider about whether or not a prostate cancer screening test (PSA) is right for you.    Shots: Get a flu shot each year. Get a tetanus shot every 10 years.     Nutrition:  Eat at least 5 servings of fruits and vegetables daily.   Eat whole-grain bread, whole-wheat pasta and brown rice instead of white grains and rice.   Get adequate Calcium and Vitamin D.     Lifestyle  Exercise for at least 150 minutes a week (30 minutes a day, 5 days a week). This will help you control your weight and prevent disease.   Limit alcohol to one drink per day.   No smoking.   Wear sunscreen to prevent skin cancer.   See your dentist every six months for an exam and cleaning.

## 2022-10-12 NOTE — PROGRESS NOTES
SUBJECTIVE:   CC: Shamir is an 47 year old who presents for preventive health visit.     Patient has been advised of split billing requirements and indicates understanding: Yes  Healthy Habits:   PHQ-2 Total Score: 0  Healthy Habits:     Getting at least 3 servings of Calcium per day:  NO    Bi-annual eye exam:  NO    Dental care twice a year:  Yes    Sleep apnea or symptoms of sleep apnea:  None    Diet:  Regular (no restrictions)    Frequency of exercise:  4-5 days/week    Duration of exercise:  30-45 minutes    Taking medications regularly:  Not Applicable    Medication side effects:  Not applicable    PHQ-2 Total Score: 0    Additional concerns today:  Yes    *  Stye on left eye, started out as a small bump about 2 months ago and over the last week things have been progressively getting worse. He states he has been doing warm compress 3 times daily with no relief.  - started with one in the right eye which mostly resolved, left eye then started and has been getting worse this week. Tender, no pus or discharge.  No contacts, rarely wears glasses. No vision changes. He did have an eye exam within the past year.     * GERD under better control with pepcid daily. Triggered by spicy foods, IPA. A lot more stress with work last year too.       Today's PHQ-2 Score:   PHQ-2 ( 1999 [employer]) 10/12/2022   Q1: Little interest or pleasure in doing things 0   Q2: Feeling down, depressed or hopeless 0   PHQ-2 Score 0   PHQ-2 Total Score (12-17 Years)- Positive if 3 or more points; Administer PHQ-A if positive -   Q1: Little interest or pleasure in doing things Not at all   Q2: Feeling down, depressed or hopeless Not at all   PHQ-2 Score 0       Abuse: Current or Past (Physical, Sexual or Emotional) - No  Do you feel safe in your environment? Yes    Have you ever done Advance Care Planning? (For example, a Health Directive, POLST, or a discussion with a medical provider or your loved ones about your wishes): No, advance care  planning information given to patient to review.  Patient plans to discuss their wishes with loved ones or provider.      Social History     Tobacco Use     Smoking status: Never     Smokeless tobacco: Never   Substance Use Topics     Alcohol use: Yes     Comment: 1x month     If you drink alcohol do you typically have >3 drinks per day or >7 drinks per week? No    Alcohol Use 10/12/2022   Prescreen: >3 drinks/day or >7 drinks/week? No   Prescreen: >3 drinks/day or >7 drinks/week? -   No flowsheet data found.    Reviewed orders with patient.  Reviewed health maintenance and updated orders accordingly - Yes  Labs reviewed in EPIC  BP Readings from Last 3 Encounters:   10/12/22 121/80   12/02/21 128/82   02/19/20 134/80    Wt Readings from Last 3 Encounters:   10/12/22 67.6 kg (149 lb 1.6 oz)   12/02/21 70.9 kg (156 lb 4.8 oz)   10/18/19 69.3 kg (152 lb 11.2 oz)                  Patient Active Problem List   Diagnosis     Hyperlipidemia     Meniscus, medial, derangement     Achilles tendon tear     Acute pain of left shoulder     Gastroesophageal reflux disease, esophagitis presence not specified     Past Surgical History:   Procedure Laterality Date     ARTHROSCOPY KNEE WITH MEDIAL MENISCECTOMY  10/19/2011    Procedure:ARTHROSCOPY KNEE WITH MEDIAL MENISCECTOMY; Right Knee Arthroscopy with Medial Menisectomy--Anes Choice; Surgeon:SUMI BEAN; Location:[facility]     AS REPAIR OF BICEPS TENDON AT ELBOW Left      HERNIORRHAPHY HIATAL  1991     LASIK       REPAIR TENDON ACHILLES  11/14/2011    Procedure:REPAIR TENDON ACHILLES; Left ankle achilles tendon rupure repair-popliteal block; Surgeon:KATINA STEIN; Location:[facility]       Social History     Tobacco Use     Smoking status: Never     Smokeless tobacco: Never   Substance Use Topics     Alcohol use: Yes     Comment: 1x month     Family History   Problem Relation Age of Onset     Circulatory Mother         DVT     Blood Disease Mother         Factor five       Hypertension Brother      Lipids Brother      C.A.D. Maternal Grandfather      Cancer No family hx of            Breast Cancer Screening:     Reviewed and updated as needed this visit by clinical staff   Tobacco  Allergies  Meds  Problems  Med Hx  Surg Hx  Fam Hx          Reviewed and updated as needed this visit by Provider   Tobacco  Allergies  Meds  Problems  Med Hx  Surg Hx  Fam Hx         Past Medical History:   Diagnosis Date     FHx: factor V Leiden mutation     Patient has tested negative in the past     PONV (postoperative nausea and vomiting)       Past Surgical History:   Procedure Laterality Date     ARTHROSCOPY KNEE WITH MEDIAL MENISCECTOMY  10/19/2011    Procedure:ARTHROSCOPY KNEE WITH MEDIAL MENISCECTOMY; Right Knee Arthroscopy with Medial Menisectomy--Anes Choice; Surgeon:SUMI BEAN; Location:[facility]     AS REPAIR OF BICEPS TENDON AT ELBOW Left      HERNIORRHAPHY HIATAL  1991     LASIK       REPAIR TENDON ACHILLES  11/14/2011    Procedure:REPAIR TENDON ACHILLES; Left ankle achilles tendon rupure repair-popliteal block; Surgeon:KATINA STEIN; Location:[facility]       Review of Systems   Constitutional: Negative for chills and fever.   HENT: Negative for congestion, ear pain, hearing loss and sore throat.    Eyes: Positive for pain. Negative for visual disturbance.   Respiratory: Negative for cough and shortness of breath.    Cardiovascular: Negative for chest pain, palpitations and peripheral edema.   Gastrointestinal: Negative for abdominal pain, constipation, diarrhea, heartburn, hematochezia and nausea.   Genitourinary: Negative for dysuria, frequency, genital sores, hematuria, impotence, penile discharge and urgency.   Musculoskeletal: Negative for arthralgias, joint swelling and myalgias.   Skin: Negative for rash.   Neurological: Negative for dizziness, weakness, headaches and paresthesias.   Psychiatric/Behavioral: Negative for mood changes. The patient is not  "nervous/anxious.       OBJECTIVE:   /80   Pulse 76   Temp 98.7  F (37.1  C) (Tympanic)   Resp 16   Ht 1.668 m (5' 5.67\")   Wt 67.6 kg (149 lb 1.6 oz)   SpO2 97%   BMI 24.31 kg/m    Physical Exam  GENERAL: healthy, alert and no distress  EYES: PERRL, EOMI, conjunctivae and sclerae normal and POSITIVE right lower outer eyelid shows small tender firm stye no erythema. Left lower eyelid shows large firm tender stye. No erythema, no discharge or purulence.  HENT: ear canals and TM's normal, nose and mouth without ulcers or lesions  NECK: no adenopathy, no asymmetry, masses, or scars and thyroid normal to palpation  RESP: lungs clear to auscultation - no rales, rhonchi or wheezes  CV: regular rate and rhythm, normal S1 S2, no S3 or S4, no murmur, click or rub, no peripheral edema and peripheral pulses strong  ABDOMEN: soft, nontender, no hepatosplenomegaly, no masses and bowel sounds normal  MS: no gross musculoskeletal defects noted, no edema  SKIN: no suspicious lesions or rashes  NEURO: Normal strength and tone, mentation intact and speech normal  BACK: no CVA tenderness, no paralumbar tenderness  PSYCH: mentation appears normal, affect normal/bright  LYMPH: no cervical, supraclavicular, axillary, or inguinal adenopathy    Diagnostic Test Results:  Labs reviewed in Epic    ASSESSMENT/PLAN:     ASSESSMENT/PLAN:      ICD-10-CM    1. Routine general medical examination at a health care facility  Z00.00       2. Screen for colon cancer  Z12.11 Colonoscopy Screening  Referral      3. Screening for HIV (human immunodeficiency virus)  Z11.4 HIV Antigen Antibody Combo      4. Need for hepatitis C screening test  Z11.59 Hepatitis C Screen Reflex to HCV RNA Quant and Genotype      5. Gastroesophageal reflux disease without esophagitis  K21.9 Adult GI  Referral - Procedure Only      6. Elevated liver enzymes  R74.8 Comprehensive metabolic panel (BMP + Alb, Alk Phos, ALT, AST, Total. Bili, TP)    " "  7. Screening for lipoid disorders  Z13.220 Lipid panel reflex to direct LDL Fasting      8. Hordeolum externum, unspecified laterality  H00.019 erythromycin (ROMYCIN) 5 MG/GM ophthalmic ointment        GERD: patient states he needs to continue pepcid daily or he will have symptoms return. pepcid works well. Discussed at some point getting EGD due to symptoms/needing daily medication. Order placed, he will consider having this done with his colonoscopy.    Patient Instructions   Schedule fasting labwork - lab only visit    Schedule eye doctor appointment for within 1 week  Continue warm compresses. Antibiotic eye ointment.    COUNSELING:  Reviewed preventive health counseling, as reflected in patient instructions       Regular exercise       Healthy diet/nutrition       Immunizations    Vaccinated for: TDAP             Consider Hep C screening for all patients one time for ages 18-79 years       HIV screeninx in teen years, 1x in adult years, and at intervals if high risk       The 10-year ASCVD risk score (Kelly CALLOWAY, et al., 2019) is: 3.4%    Values used to calculate the score:      Age: 47 years      Sex: Male      Is Non- : No      Diabetic: No      Tobacco smoker: No      Systolic Blood Pressure: 121 mmHg      Is BP treated: No      HDL Cholesterol: 57 mg/dL      Total Cholesterol: 248 mg/dL    Estimated body mass index is 24.31 kg/m  as calculated from the following:    Height as of this encounter: 1.668 m (5' 5.67\").    Weight as of this encounter: 67.6 kg (149 lb 1.6 oz).    He reports that he has never smoked. He has never used smokeless tobacco.      Counseling Resources:  ATP IV Guidelines  Pooled Cohorts Equation Calculator  Breast Cancer Risk Calculator  BRCA-Related Cancer Risk Assessment: FHS-7 Tool  FRAX Risk Assessment  ICSI Preventive Guidelines  Dietary Guidelines for Americans,   [employer] Environmental's MyPlate  ASA Prophylaxis  Lung CA Screening    Kelsi Romero PA-C  [facility] " [facility]

## 2022-11-30 ENCOUNTER — TELEPHONE (OUTPATIENT)
Dept: GASTROENTEROLOGY | Facility: CLINIC | Age: 47
End: 2022-11-30

## 2022-11-30 NOTE — TELEPHONE ENCOUNTER
Screening Questions  BLUE  KIND OF PREP RED  LOCATION [review exclusion criteria] GREEN  SEDATION TYPE        Y Are you active on mychart?       Kelsi Romero PA-C Ordering/Referring Provider?        NA What type of coverage do you have?      N Have you had a positive covid test in the last 14 days?     24.31 1. BMI  [BMI 40+ - review exclusion criteria]    Y  2. Are you able to give consent for your medical care? [IF NO,RN REVIEW]        N  3. Are you taking any prescription pain medications on a routine schedule?      N  3a. EXTENDED PREP What kind of prescription?     N 4. Do you have any chemical dependencies such as alcohol, street drugs, or methadone?    N 5. Do you have any history of post-traumatic stress syndrome, severe anxiety or history of psychosis?      **If yes 3- 5 , please schedule with MAC sedation.**          IF YES TO ANY 6 - 10 - HOSPITAL SETTING ONLY.     N 6.   Do you need assistance transferring?     N 7.   Have you had a heart or lung transplant?    N 8.   Are you currently on dialysis?   N 9.   Do you use daily home oxygen?   N 10. Do you take nitroglycerin?   10a. N If yes, how often?     11. [FEMALES]  N Are you currently pregnant?    11a. N If yes, how many weeks? [ Greater than 12 weeks, OR NEEDED]    N 12. Do you have Pulmonary Hypertension? *NEED PAC APPT AT [facility]*     N 13. [review exclusion criteria]  Do you have any implantable devices in your body (pacemaker, defib, LVAD)?    N 14. In the past 6 months, have you had any heart related issues including cardiomyopathy or heart attack?     14a. N If yes, did it require cardiac stenting if so when?     N 15. Have you had a stroke or Transient ischemic attack (TIA - aka  mini stroke ) within 6 months?      N 16. Do you have mod to severe Obstructive Sleep Apnea?  [Hospital only - Ok at [place]]    N 17. Do you have SEVERE AND UNCONTROLLED asthma? *NEED PAC APPT AT [facility]*     N 18. Are you currently taking any blood thinners?  "    18a. If yes, inform patient to \"follow up w/ ordering provider for bridging instructions.\"    N 19. Do you take the medication Phentermine?    19a. If yes, \"Hold for 7 days before procedure.  Please consult your prescribing provider if you have questions about holding this medication.\"     N  20. Do you have chronic kidney disease?      N  21. Do you have a diagnosis of diabetes?     N  22. On a regular basis do you go 3-5 days between bowel movements?     Y 23. Preferred LOCAL Pharmacy for Pre Prescription    [ LIST ONLY ONE PHARMACY]     [place], [place] - [address] [address] [facility] [address]        - CLOSING REMINDERS -    Informed patient they will need an adult    Cannot take any type of public or medical transportation alone    Conscious Sedation- Needs  for 6 hours after the procedure       MAC/General-Needs  for 24 hours after procedure    Pre-Procedure Covid test to be completed [[facility] PCR Testing Required]    Confirmed Nurse will call to complete assessment       - SCHEDULING DETAILS -     DR. SABA  Surgeon    12/6/2022  Date of Procedure  Upper and Lower Endoscopy [EGD and Colonoscopy]  Type of Procedure Scheduled  [facility]-If you answer yes to questions #8, #20, #21Which Colonoscopy Prep was Sent?     MAC Sedation Type     NA PAC / Pre-op Required         Additional comments:            "

## 2022-12-01 RX ORDER — BISACODYL 5 MG
TABLET, DELAYED RELEASE (ENTERIC COATED) ORAL
Qty: 4 TABLET | Refills: 0 | Status: SHIPPED | OUTPATIENT
Start: 2022-12-01 | End: 2024-01-05

## 2022-12-05 ENCOUNTER — ANESTHESIA EVENT (OUTPATIENT)
Dept: GASTROENTEROLOGY | Facility: CLINIC | Age: 47
End: 2022-12-05
Payer: COMMERCIAL

## 2022-12-05 NOTE — ANESTHESIA PREPROCEDURE EVALUATION
Anesthesia Pre-Procedure Evaluation    Patient: Shamir Shea   MRN: [id number] : 1975        Procedure : Procedure(s):  COLONOSCOPY  ESOPHAGOGASTRODUODENOSCOPY (EGD)          Past Medical History:   Diagnosis Date     FHx: factor V Leiden mutation     Patient has tested negative in the past     PONV (postoperative nausea and vomiting)       Past Surgical History:   Procedure Laterality Date     ARTHROSCOPY KNEE WITH MEDIAL MENISCECTOMY  10/19/2011    Procedure:ARTHROSCOPY KNEE WITH MEDIAL MENISCECTOMY; Right Knee Arthroscopy with Medial Menisectomy--Anes Choice; Surgeon:SUMI BEAN; Location:[facility]     AS REPAIR OF BICEPS TENDON AT ELBOW Left      HERNIORRHAPHY HIATAL       LASIK       REPAIR TENDON ACHILLES  2011    Procedure:REPAIR TENDON ACHILLES; Left ankle achilles tendon rupure repair-popliteal block; Surgeon:KATINA STEIN; Location:[facility]      Allergies   Allergen Reactions     Nkda [No Known Drug Allergies]       Social History     Tobacco Use     Smoking status: Never     Smokeless tobacco: Never   Substance Use Topics     Alcohol use: Yes     Comment: 1x month      Wt Readings from Last 1 Encounters:   10/12/22 67.6 kg (149 lb 1.6 oz)        Anesthesia Evaluation   Pt has had prior anesthetic.     History of anesthetic complications  - PONV.      ROS/MED HX  ENT/Pulmonary:       Neurologic:       Cardiovascular:     (+) Dyslipidemia -----    METS/Exercise Tolerance:     Hematologic:       Musculoskeletal:       GI/Hepatic:     (+) GERD,     Renal/Genitourinary:       Endo:       Psychiatric/Substance Use:       Infectious Disease:       Malignancy:       Other:            Physical Exam    Airway  airway exam normal      Mallampati: II       Respiratory Devices and Support         Dental  no notable dental history         Cardiovascular   cardiovascular exam normal          Pulmonary   pulmonary exam normal                OUTSIDE LABS:  CBC:   Lab Results   Component Value  Date    WBC 7.8 04/04/2018    HGB 15.0 04/04/2018    HGB 14.2 11/11/2011    HCT 46.0 04/04/2018     04/04/2018     BMP:   Lab Results   Component Value Date     12/10/2021    POTASSIUM 5.0 12/10/2021    CHLORIDE 104 12/10/2021    CO2 26 12/10/2021    BUN 16 12/10/2021    CR 0.97 12/10/2021    GLC 93 12/10/2021     COAGS: No results found for: PTT, INR, FIBR  POC: No results found for: BGM, HCG, HCGS  HEPATIC:   Lab Results   Component Value Date    ALBUMIN 4.2 12/10/2021    PROTTOTAL 7.7 12/10/2021    ALT 49 (H) 12/10/2021    AST 31 12/10/2021    ALKPHOS 83 12/10/2021    BILITOTAL 0.6 12/10/2021     OTHER:   Lab Results   Component Value Date    JEFFREY 10.1 12/10/2021    TSH 1.40 12/10/2021    CRP 3.6 (H) 12/10/2021       Anesthesia Plan    ASA Status:  1   NPO Status:  NPO Appropriate    Anesthesia Type: General.     - Airway: Native airway   Induction: Intravenous, Propofol.   Maintenance: TIVA.        Consents    Anesthesia Plan(s) and associated risks, benefits, and realistic alternatives discussed. Questions answered and patient/representative(s) expressed understanding.     - Discussed: Risks, Benefits and Alternatives for BOTH SEDATION and the PROCEDURE were discussed     - Discussed with:  Patient      - Extended Intubation/Ventilatory Support Discussed: No.      - Patient is DNR/DNI Status: No    Use of blood products discussed: No .     Postoperative Care    Pain management: Multi-modal analgesia.   PONV prophylaxis: Background Propofol Infusion     Comments:                Tino Mcdonnell CRNA, APRN CRNA

## 2022-12-06 ENCOUNTER — HOSPITAL ENCOUNTER (OUTPATIENT)
Facility: CLINIC | Age: 47
Discharge: HOME | End: 2022-12-06
Attending: SURGERY | Admitting: SURGERY
Payer: COMMERCIAL

## 2022-12-06 ENCOUNTER — ANESTHESIA (OUTPATIENT)
Dept: GASTROENTEROLOGY | Facility: CLINIC | Age: 47
End: 2022-12-06
Payer: COMMERCIAL

## 2022-12-06 VITALS
RESPIRATION RATE: 16 BRPM | HEART RATE: 66 BPM | SYSTOLIC BLOOD PRESSURE: 124 MMHG | TEMPERATURE: 96 F | OXYGEN SATURATION: 94 % | DIASTOLIC BLOOD PRESSURE: 85 MMHG

## 2022-12-06 DIAGNOSIS — Z12.11 SPECIAL SCREENING FOR MALIGNANT NEOPLASMS, COLON: Primary | ICD-10-CM

## 2022-12-06 LAB
COLONOSCOPY: NORMAL
UPPER GI ENDOSCOPY: NORMAL

## 2022-12-06 PROCEDURE — 250N000009 HC RX 250: Performed by: NURSE ANESTHETIST, CERTIFIED REGISTERED

## 2022-12-06 PROCEDURE — 43239 EGD BIOPSY SINGLE/MULTIPLE: CPT | Performed by: SURGERY

## 2022-12-06 PROCEDURE — 88305 TISSUE EXAM BY PATHOLOGIST: CPT | Mod: 26 | Performed by: PATHOLOGY

## 2022-12-06 PROCEDURE — 250N000011 HC RX IP 250 OP 636: Performed by: NURSE ANESTHETIST, CERTIFIED REGISTERED

## 2022-12-06 PROCEDURE — 258N000003 HC RX IP 258 OP 636: Performed by: SURGERY

## 2022-12-06 PROCEDURE — 45385 COLONOSCOPY W/LESION REMOVAL: CPT | Mod: PT | Performed by: SURGERY

## 2022-12-06 PROCEDURE — 370N000017 HC ANESTHESIA TECHNICAL FEE, PER MIN: Performed by: SURGERY

## 2022-12-06 PROCEDURE — 88305 TISSUE EXAM BY PATHOLOGIST: CPT | Mod: TC | Performed by: SURGERY

## 2022-12-06 PROCEDURE — 43239 EGD BIOPSY SINGLE/MULTIPLE: CPT | Mod: 51 | Performed by: SURGERY

## 2022-12-06 RX ORDER — PROPOFOL 10 MG/ML
INJECTION, EMULSION INTRAVENOUS PRN
Status: DISCONTINUED | OUTPATIENT
Start: 2022-12-06 | End: 2022-12-06

## 2022-12-06 RX ORDER — LIDOCAINE 40 MG/G
CREAM TOPICAL
Status: DISCONTINUED | OUTPATIENT
Start: 2022-12-06 | End: 2022-12-06 | Stop reason: HOSPADM

## 2022-12-06 RX ORDER — SODIUM CHLORIDE, SODIUM LACTATE, POTASSIUM CHLORIDE, CALCIUM CHLORIDE 600; 310; 30; 20 MG/100ML; MG/100ML; MG/100ML; MG/100ML
INJECTION, SOLUTION INTRAVENOUS CONTINUOUS
Status: DISCONTINUED | OUTPATIENT
Start: 2022-12-06 | End: 2022-12-06 | Stop reason: HOSPADM

## 2022-12-06 RX ORDER — ONDANSETRON 4 MG/1
4 TABLET, ORALLY DISINTEGRATING ORAL EVERY 30 MIN PRN
Status: DISCONTINUED | OUTPATIENT
Start: 2022-12-06 | End: 2022-12-06 | Stop reason: HOSPADM

## 2022-12-06 RX ORDER — GLYCOPYRROLATE 0.2 MG/ML
INJECTION, SOLUTION INTRAMUSCULAR; INTRAVENOUS PRN
Status: DISCONTINUED | OUTPATIENT
Start: 2022-12-06 | End: 2022-12-06

## 2022-12-06 RX ORDER — ONDANSETRON 2 MG/ML
4 INJECTION INTRAMUSCULAR; INTRAVENOUS EVERY 30 MIN PRN
Status: DISCONTINUED | OUTPATIENT
Start: 2022-12-06 | End: 2022-12-06 | Stop reason: HOSPADM

## 2022-12-06 RX ORDER — PROPOFOL 10 MG/ML
INJECTION, EMULSION INTRAVENOUS CONTINUOUS PRN
Status: DISCONTINUED | OUTPATIENT
Start: 2022-12-06 | End: 2022-12-06

## 2022-12-06 RX ORDER — LIDOCAINE HYDROCHLORIDE 10 MG/ML
INJECTION, SOLUTION EPIDURAL; INFILTRATION; INTRACAUDAL; PERINEURAL PRN
Status: DISCONTINUED | OUTPATIENT
Start: 2022-12-06 | End: 2022-12-06

## 2022-12-06 RX ORDER — ONDANSETRON 2 MG/ML
4 INJECTION INTRAMUSCULAR; INTRAVENOUS
Status: DISCONTINUED | OUTPATIENT
Start: 2022-12-06 | End: 2022-12-06 | Stop reason: HOSPADM

## 2022-12-06 RX ADMIN — PROPOFOL 50 MG: 10 INJECTION, EMULSION INTRAVENOUS at 07:30

## 2022-12-06 RX ADMIN — SODIUM CHLORIDE, POTASSIUM CHLORIDE, SODIUM LACTATE AND CALCIUM CHLORIDE: 600; 310; 30; 20 INJECTION, SOLUTION INTRAVENOUS at 07:28

## 2022-12-06 RX ADMIN — GLYCOPYRROLATE 0.1 MG: 0.2 INJECTION, SOLUTION INTRAMUSCULAR; INTRAVENOUS at 07:30

## 2022-12-06 RX ADMIN — PROPOFOL 200 MCG/KG/MIN: 10 INJECTION, EMULSION INTRAVENOUS at 07:30

## 2022-12-06 RX ADMIN — LIDOCAINE HYDROCHLORIDE 100 MG: 10 INJECTION, SOLUTION EPIDURAL; INFILTRATION; INTRACAUDAL; PERINEURAL at 07:30

## 2022-12-06 RX ADMIN — TOPICAL ANESTHETIC 2 SPRAY: 200 SPRAY DENTAL; PERIODONTAL at 07:28

## 2022-12-06 ASSESSMENT — ACTIVITIES OF DAILY LIVING (ADL): ADLS_ACUITY_SCORE: 35

## 2022-12-06 NOTE — LETTER
Shamir Shea  [address] [facility] [address]      December 9, 2022    Dear Shamir,  This letter is written to inform you of the results of your recent colonoscopy.  Your examination showed polyp(s) in your sigmoid colon. All polyps were removed in their entirety and sent for review by a pathologist. As you will see on the pathology report below, the tissue(s) were tubular adenomatous polyps. Your examination was otherwise without abnormality.      Your upper endoscopy was completely benign.    Final Diagnosis   A.  Stomach, antrum: Biopsy:  - Oxyntic type mucosa with minimal chronic inflammation  - No Helicobacter pylori-like organisms seen on H&E examination      B.  Gastroesophageal junction: Biopsy:  - Squamocolumnar mucosa with chronic inflammation, negative for intestinal metaplasia and dysplasia  - Squamous mucosa with reflux esophagitis     C.  Colon, sigmoid: Polypectomy:  - Tubular adenoma  - No evidence of high-grade dysplasia or invasive malignancy         Adenomatous polyps are entirely benign (non-cancerous); however, patients who have developed these polyps are at an increased risk for developing additional polyps in the future. If these are not eventually removed, there is a risk of developing colon cancer. We will advise more frequent examinations with you because of the risk associated with this type of polyp.    Given these findings, your personal history of polyps, I recommend that you undergo a repeat colonoscopy in 7 year(s) for surveillance. We will enter you into a recall system so you receive a reminder closer to the time that you are due for repeat examination.     Please remember that this recommendation is made with the understanding that you are not experiencing persistent changes in bowel function, bleeding per rectum, and/or significant abdominal pain. If you experience these symptoms, please contact your primary care provider for a further evaluation.     If you have any  questions or concerns about the results of your colonoscopy or the appropriate follow-up, please contact my assistant at [phone]    Sincerely,      Rancho Thompson,    [facility]  ___

## 2022-12-06 NOTE — ANESTHESIA CARE TRANSFER NOTE
Patient: Shamir Shea    Procedure: Procedure(s):  COLONOSCOPY, FLEXIBLE, WITH LESION REMOVAL USING SNARE  ESOPHAGOGASTRODUODENOSCOPY, WITH BIOPSY       Diagnosis: GERD without esophagitis [K21.9]  Colon cancer screening [Z12.11]  Diagnosis Additional Information: No value filed.    Anesthesia Type:   General     Note:    Oropharynx: oropharynx clear of all foreign objects and spontaneously breathing  Level of Consciousness: awake  Oxygen Supplementation: room air    Independent Airway: airway patency satisfactory and stable  Dentition: dentition unchanged  Vital Signs Stable: post-procedure vital signs reviewed and stable  Report to RN Given: handoff report given  Patient transferred to: Phase II    Handoff Report: Identifed the Patient, Identified the Reponsible Provider, Reviewed the pertinent medical history, Discussed the surgical course, Reviewed Intra-OP anesthesia mangement and issues during anesthesia, Set expectations for post-procedure period and Allowed opportunity for questions and acknowledgement of understanding      Vitals:  Vitals Value Taken Time   BP     Temp     Pulse     Resp     SpO2         Electronically Signed By: BILL Damon CRNA  December 6, 2022  7:57 AM

## 2022-12-06 NOTE — H&P
47 year old year old male here for colonoscopy for screening. This is patient's first colonoscopy.  Patient denies blood in stool or change in stool caliber.  There is no known family history of colon cancer or polyps.    He has a history of reflux, well controlled with pepcid.      Patient Active Problem List   Diagnosis     Hyperlipidemia     Meniscus, medial, derangement     Achilles tendon tear     Acute pain of left shoulder     Gastroesophageal reflux disease, esophagitis presence not specified       Past Medical History:   Diagnosis Date     FHx: factor V Leiden mutation     Patient has tested negative in the past     PONV (postoperative nausea and vomiting)        Past Surgical History:   Procedure Laterality Date     ARTHROSCOPY KNEE WITH MEDIAL MENISCECTOMY  10/19/2011    Procedure:ARTHROSCOPY KNEE WITH MEDIAL MENISCECTOMY; Right Knee Arthroscopy with Medial Menisectomy--Anes Choice; Surgeon:SUMI BEAN; Location:[facility]     AS REPAIR OF BICEPS TENDON AT ELBOW Left      HERNIORRHAPHY HIATAL  1991     LASIK       REPAIR TENDON ACHILLES  11/14/2011    Procedure:REPAIR TENDON ACHILLES; Left ankle achilles tendon rupure repair-popliteal block; Surgeon:KATINA STEIN; Location:[facility]       Family History   Problem Relation Age of Onset     Circulatory Mother         DVT     Blood Disease Mother         Factor five      Hypertension Brother      Lipids Brother      C.A.D. Maternal Grandfather      Cancer No family hx of        Current Outpatient Rx   Medication Sig Dispense Refill     bisacodyl (DULCOLAX) 5 MG EC tablet Take 2 tablets at 3 pm the day before your procedure. If your procedure is before 11 am, take 2 additional tablets at 11 pm. If your procedure is after 11 am, take 2 additional tablets at 6 am. For additional instructions refer to your colonoscopy prep instructions. 4 tablet 0     polyethylene glycol (GOLYTELY) 236 g suspension The night before the exam at 6 pm drink an 8-ounce glass  every 15 minutes until the jug is half empty. If you arrive before 11 AM: Drink the other half of the Golytely jug at 11 PM night before procedure. If you arrive after 11 AM: Drink the other half of the Golytely jug at 6 AM day of procedure. For additional instructions refer to your colonoscopy prep instructions. 4000 mL 0       Allergies   Allergen Reactions     Nkda [No Known Drug Allergies]        Pt reports that he has never smoked. He has never used smokeless tobacco. He reports current alcohol use. He reports that he does not use drugs.    Exam:  BP (!) 141/80   Pulse 80   Temp 98.3  F (36.8  C) (Oral)   Resp 16   SpO2 100%     Awake, Alert OX3  Lungs - CTA bilaterally  CV - RRR, no murmurs, distal pulses intact  Abd - soft, non-distended, non-tender, +BS, umbilical hernia repair incision   Extr - No cyanosis or edema    A/P 47 year old year old male in need of colonoscopy for screening and upper endoscopy for reflux. Risks, benefits, alternatives, and complications were discussed including the possibility of perforation, bleeding, missed lesion and the patient agreed to proceed    Rancho Thompson DO on 12/6/2022 at 7:17 AM

## 2022-12-06 NOTE — ANESTHESIA POSTPROCEDURE EVALUATION
Patient: Shamir Shea    Procedure: Procedure(s):  COLONOSCOPY, FLEXIBLE, WITH LESION REMOVAL USING SNARE  ESOPHAGOGASTRODUODENOSCOPY, WITH BIOPSY       Anesthesia Type:  General    Note:  Disposition: Outpatient   Postop Pain Control: Uneventful            Sign Out: Well controlled pain   PONV: No   Neuro/Psych: Uneventful            Sign Out: Acceptable/Baseline neuro status   Airway/Respiratory: Uneventful   CV/Hemodynamics: Uneventful            Sign Out: Acceptable CV status; No obvious hypovolemia; No obvious fluid overload   Other NRE: NONE   DID A NON-ROUTINE EVENT OCCUR? No           Last vitals:  Vitals:    12/06/22 0641   BP: (!) 141/80   Pulse: 80   Resp: 16   Temp: 36.8  C (98.3  F)   SpO2: 100%       Electronically Signed By: BILL Damon CRNA  December 6, 2022  7:57 AM

## 2022-12-07 LAB
PATH REPORT.COMMENTS IMP SPEC: NORMAL
PATH REPORT.COMMENTS IMP SPEC: NORMAL
PATH REPORT.FINAL DX SPEC: NORMAL
PATH REPORT.GROSS SPEC: NORMAL
PATH REPORT.MICROSCOPIC SPEC OTHER STN: NORMAL
PATH REPORT.RELEVANT HX SPEC: NORMAL
PHOTO IMAGE: NORMAL

## 2023-06-20 ENCOUNTER — LAB (OUTPATIENT)
Dept: LAB | Facility: CLINIC | Age: 48
End: 2023-06-20
Payer: COMMERCIAL

## 2023-06-20 DIAGNOSIS — Z11.59 NEED FOR HEPATITIS C SCREENING TEST: ICD-10-CM

## 2023-06-20 DIAGNOSIS — Z11.4 SCREENING FOR HIV (HUMAN IMMUNODEFICIENCY VIRUS): ICD-10-CM

## 2023-06-20 DIAGNOSIS — R74.8 ELEVATED LIVER ENZYMES: ICD-10-CM

## 2023-06-20 DIAGNOSIS — Z13.220 SCREENING FOR LIPOID DISORDERS: ICD-10-CM

## 2023-06-20 LAB
ALBUMIN SERPL BCG-MCNC: 4.7 G/DL (ref 3.5–5.2)
ALP SERPL-CCNC: 82 U/L (ref 40–129)
ALT SERPL W P-5'-P-CCNC: 30 U/L (ref 0–70)
ANION GAP SERPL CALCULATED.3IONS-SCNC: 10 MMOL/L (ref 7–15)
AST SERPL W P-5'-P-CCNC: 30 U/L (ref 0–45)
BILIRUB SERPL-MCNC: 0.3 MG/DL
BUN SERPL-MCNC: 19.8 MG/DL (ref 6–20)
CALCIUM SERPL-MCNC: 9.8 MG/DL (ref 8.6–10)
CHLORIDE SERPL-SCNC: 104 MMOL/L (ref 98–107)
CHOLEST SERPL-MCNC: 228 MG/DL
CREAT SERPL-MCNC: 1.04 MG/DL (ref 0.67–1.17)
DEPRECATED HCO3 PLAS-SCNC: 28 MMOL/L (ref 22–29)
GFR SERPL CREATININE-BSD FRML MDRD: 87 ML/MIN/1.73M2
GLUCOSE SERPL-MCNC: 94 MG/DL (ref 70–99)
HDLC SERPL-MCNC: 56 MG/DL
LDLC SERPL CALC-MCNC: 142 MG/DL
NONHDLC SERPL-MCNC: 172 MG/DL
POTASSIUM SERPL-SCNC: 4.4 MMOL/L (ref 3.4–5.3)
PROT SERPL-MCNC: 7.7 G/DL (ref 6.4–8.3)
SODIUM SERPL-SCNC: 142 MMOL/L (ref 136–145)
TRIGL SERPL-MCNC: 148 MG/DL

## 2023-06-20 PROCEDURE — 80061 LIPID PANEL: CPT

## 2023-06-20 PROCEDURE — 86803 HEPATITIS C AB TEST: CPT

## 2023-06-20 PROCEDURE — 87389 HIV-1 AG W/HIV-1&-2 AB AG IA: CPT

## 2023-06-20 PROCEDURE — 80053 COMPREHEN METABOLIC PANEL: CPT

## 2023-06-20 PROCEDURE — 36415 COLL VENOUS BLD VENIPUNCTURE: CPT

## 2023-06-21 LAB
HCV AB SERPL QL IA: NONREACTIVE
HIV 1+2 AB+HIV1 P24 AG SERPL QL IA: NONREACTIVE

## 2023-09-12 ENCOUNTER — PATIENT OUTREACH (OUTPATIENT)
Dept: CARE COORDINATION | Facility: CLINIC | Age: 48
End: 2023-09-12
Payer: COMMERCIAL

## 2023-09-26 ENCOUNTER — PATIENT OUTREACH (OUTPATIENT)
Dept: CARE COORDINATION | Facility: CLINIC | Age: 48
End: 2023-09-26
Payer: COMMERCIAL

## 2024-01-04 ENCOUNTER — TELEPHONE (OUTPATIENT)
Dept: FAMILY MEDICINE | Facility: CLINIC | Age: 49
End: 2024-01-04
Payer: COMMERCIAL

## 2024-01-04 NOTE — TELEPHONE ENCOUNTER
Patient has appointment tomorrow morning. Please change from office visit to preventive visit.  Anitha Rmoero PA-C

## 2024-01-05 ENCOUNTER — OFFICE VISIT (OUTPATIENT)
Dept: FAMILY MEDICINE | Facility: CLINIC | Age: 49
End: 2024-01-05
Payer: COMMERCIAL

## 2024-01-05 VITALS
SYSTOLIC BLOOD PRESSURE: 116 MMHG | HEART RATE: 78 BPM | BODY MASS INDEX: 24.73 KG/M2 | WEIGHT: 151.7 LBS | OXYGEN SATURATION: 97 % | DIASTOLIC BLOOD PRESSURE: 80 MMHG | TEMPERATURE: 98.2 F | RESPIRATION RATE: 15 BRPM

## 2024-01-05 DIAGNOSIS — E78.2 MIXED HYPERLIPIDEMIA: ICD-10-CM

## 2024-01-05 DIAGNOSIS — R22.9 SUBCUTANEOUS NODULE: ICD-10-CM

## 2024-01-05 DIAGNOSIS — Z00.00 ROUTINE GENERAL MEDICAL EXAMINATION AT A HEALTH CARE FACILITY: Primary | ICD-10-CM

## 2024-01-05 DIAGNOSIS — Z13.1 ENCOUNTER FOR SCREENING EXAMINATION FOR IMPAIRED GLUCOSE REGULATION AND DIABETES MELLITUS: ICD-10-CM

## 2024-01-05 DIAGNOSIS — K21.9 GASTROESOPHAGEAL REFLUX DISEASE WITHOUT ESOPHAGITIS: ICD-10-CM

## 2024-01-05 DIAGNOSIS — Z12.5 SCREENING FOR PROSTATE CANCER: ICD-10-CM

## 2024-01-05 PROCEDURE — 99213 OFFICE O/P EST LOW 20 MIN: CPT | Mod: 25 | Performed by: PHYSICIAN ASSISTANT

## 2024-01-05 PROCEDURE — 99396 PREV VISIT EST AGE 40-64: CPT | Mod: 25 | Performed by: PHYSICIAN ASSISTANT

## 2024-01-05 PROCEDURE — 90750 HZV VACC RECOMBINANT IM: CPT | Performed by: PHYSICIAN ASSISTANT

## 2024-01-05 PROCEDURE — 90471 IMMUNIZATION ADMIN: CPT | Performed by: PHYSICIAN ASSISTANT

## 2024-01-05 RX ORDER — CHLORAL HYDRATE 500 MG
CAPSULE ORAL
COMMUNITY
Start: 2023-11-01

## 2024-01-05 ASSESSMENT — ENCOUNTER SYMPTOMS
WEAKNESS: 0
HEMATOCHEZIA: 0
ARTHRALGIAS: 0
CHILLS: 0
DYSURIA: 0
DIZZINESS: 0
FREQUENCY: 0
MYALGIAS: 0
PALPITATIONS: 0
JOINT SWELLING: 0
HEMATURIA: 0
COUGH: 0
SORE THROAT: 0
CONSTIPATION: 0
ABDOMINAL PAIN: 0
HEARTBURN: 1
EYE PAIN: 0
NERVOUS/ANXIOUS: 0
SHORTNESS OF BREATH: 0
PARESTHESIAS: 0
NAUSEA: 0
DIARRHEA: 0
FEVER: 0
HEADACHES: 0

## 2024-01-05 NOTE — PROGRESS NOTES
SUBJECTIVE:   Shamir is a 48 year old, presenting for the following:  No chief complaint on file.        1/5/2024     8:27 AM   Additional Questions   Roomed by Rivka   Accompanied by Self       Healthy Habits:     Getting at least 3 servings of Calcium per day:  NO    Bi-annual eye exam:  Yes    Dental care twice a year:  Yes    Sleep apnea or symptoms of sleep apnea:  None    Diet:  Regular (no restrictions)    Frequency of exercise:  2-3 days/week    Duration of exercise:  15-30 minutes    Taking medications regularly:  Yes    Medication side effects:  Not applicable    Additional concerns today:  Yes    Patient not fasting today, did have a protein shake this morning around 6:30 am  bump on right forearm, states he noticed it about 6 months ago denies pain or change in size.  Also bump on left lower abdomen noticed this about 6 months ago. Denies any pain or change in size  Follow upon acid reflux. EGD last year  Go over lab results he had done in 6/20/2023    Takes famotidine once daily. Well controlled in general.   Has tried stopping it and had issues.    Today's PHQ-2 Score:       1/5/2024     7:03 AM   PHQ-2 ( 1999 [employer])   Q1: Little interest or pleasure in doing things 0   Q2: Feeling down, depressed or hopeless 0   PHQ-2 Score 0   Q1: Little interest or pleasure in doing things Not at all   Q2: Feeling down, depressed or hopeless Not at all   PHQ-2 Score 0     Social History     Tobacco Use    Smoking status: Never    Smokeless tobacco: Never   Substance Use Topics    Alcohol use: Yes     Comment: 1 drink per month             1/5/2024     7:02 AM   Alcohol Use   Prescreen: >3 drinks/day or >7 drinks/week? No     Reviewed orders with patient.  Reviewed health maintenance and updated orders accordingly - Yes  Lab work is in process  Labs reviewed in EPIC  BP Readings from Last 3 Encounters:   01/05/24 116/80   12/06/22 124/85   10/12/22 121/80    Wt Readings from Last 3 Encounters:   01/05/24 68.8  kg (151 lb 11.2 oz)   10/12/22 67.6 kg (149 lb 1.6 oz)   12/02/21 70.9 kg (156 lb 4.8 oz)                  Patient Active Problem List   Diagnosis    Hyperlipidemia    Meniscus, medial, derangement    Achilles tendon tear    Acute pain of left shoulder    Gastroesophageal reflux disease, esophagitis presence not specified     Past Surgical History:   Procedure Laterality Date    ARTHROSCOPY KNEE WITH MEDIAL MENISCECTOMY  10/19/2011    Procedure:ARTHROSCOPY KNEE WITH MEDIAL MENISCECTOMY; Right Knee Arthroscopy with Medial Menisectomy--Anes Choice; Surgeon:SUMI BEAN; Location:[facility]    AS REPAIR OF BICEPS TENDON AT ELBOW Left     COLONOSCOPY N/A 12/6/2022    Procedure: COLONOSCOPY, FLEXIBLE, WITH LESION REMOVAL USING SNARE;  Surgeon: Rancho Thompson DO;  Location: [facility]    ESOPHAGOSCOPY, GASTROSCOPY, DUODENOSCOPY (EGD), COMBINED N/A 12/6/2022    Procedure: ESOPHAGOGASTRODUODENOSCOPY, WITH BIOPSY;  Surgeon: Rancho Thompson DO;  Location: [facility]    HERNIORRHAPHY HIATAL  1991    LASIK      REPAIR TENDON ACHILLES  11/14/2011    Procedure:REPAIR TENDON ACHILLES; Left ankle achilles tendon rupure repair-popliteal block; Surgeon:KATINA STEIN; Location:[facility]       Social History     Tobacco Use    Smoking status: Never    Smokeless tobacco: Never   Substance Use Topics    Alcohol use: Yes     Comment: 1 drink per month     Family History   Problem Relation Age of Onset    Circulatory Mother         DVT    Blood Disease Mother         Factor five     Hypertension Brother     Lipids Brother     C.A.D. Maternal Grandfather     Cancer No family hx of          Reviewed and updated as needed this visit by clinical staff   Tobacco  Allergies  Meds     [facility] Hx          Reviewed and updated as needed this visit by Provider         [facility] Hx         Past Medical History:   Diagnosis Date    FHx: factor V Leiden mutation     Patient has tested negative in the past    PONV (postoperative nausea and  vomiting)       Past Surgical History:   Procedure Laterality Date    ARTHROSCOPY KNEE WITH MEDIAL MENISCECTOMY  10/19/2011    Procedure:ARTHROSCOPY KNEE WITH MEDIAL MENISCECTOMY; Right Knee Arthroscopy with Medial Menisectomy--Anes Choice; Surgeon:SUMI BEAN; [facility]    AS REPAIR OF BICEPS TENDON AT ELBOW Left     COLONOSCOPY N/A 12/6/2022    Procedure: COLONOSCOPY, FLEXIBLE, WITH LESION REMOVAL USING SNARE;  Surgeon: Rancho Thompson DO;  [facility]: [facility]    ESOPHAGOSCOPY, GASTROSCOPY, DUODENOSCOPY (EGD), COMBINED N/A 12/6/2022    Procedure: ESOPHAGOGASTRODUODENOSCOPY, WITH BIOPSY;  Surgeon: Rancho Thompson DO;  Location: [facility]    HERNIORRHAPHY HIATAL  1991    LASIK      REPAIR TENDON ACHILLES  11/14/2011    Procedure:REPAIR TENDON ACHILLES; Left ankle achilles tendon rupure repair-popliteal block; Surgeon:KATINA STEIN; Location:[facility]       Review of Systems   Constitutional:  Negative for chills and fever.   HENT:  Negative for congestion, ear pain, hearing loss and sore throat.    Eyes:  Negative for pain and visual disturbance.   Respiratory:  Negative for cough and shortness of breath.    Cardiovascular:  Negative for chest pain, palpitations and peripheral edema.   Gastrointestinal:  Positive for heartburn. Negative for abdominal pain, constipation, diarrhea, hematochezia and nausea.   Genitourinary:  Negative for dysuria, frequency, genital sores, hematuria, impotence, penile discharge and urgency.   Musculoskeletal:  Negative for arthralgias, joint swelling and myalgias.   Skin:  Negative for rash.   Neurological:  Negative for dizziness, weakness, headaches and paresthesias.   Psychiatric/Behavioral:  Negative for mood changes. The patient is not nervous/anxious.         OBJECTIVE:   /80   Pulse 78   Temp 98.2  F (36.8  C) (Tympanic)   Resp 15   Wt 68.8 kg (151 lb 11.2 oz)   SpO2 97%   BMI 24.73 kg/m    Physical Exam  GENERAL: healthy, alert and no  distress  EYES: Eyes grossly normal to inspection, PERRL and conjunctivae and sclerae normal  HENT: ear canals and TM's normal, nose and mouth without ulcers or lesions  NECK: no adenopathy, no asymmetry, masses, or scars and thyroid normal to palpation  RESP: lungs clear to auscultation - no rales, rhonchi or wheezes  CV: regular rate and rhythm, normal S1 S2, no S3 or S4, no murmur, click or rub, no peripheral edema and peripheral pulses strong  ABDOMEN: soft, nontender, no hepatosplenomegaly, no masses and bowel sounds normal  MS: no gross musculoskeletal defects noted, no edema  SKIN: no suspicious lesions or rashes  SKIN: POSITIVE pea sized nodule right forearm, no skin changes no tenderness. No change in size per patient. Mobile, smooth.  Left abdominal wall deeper pea sized nontender nodule  NEURO: Normal strength and tone, mentation intact and speech normal  BACK: no CVA tenderness, no paralumbar tenderness  PSYCH: mentation appears normal, affect normal/bright  LYMPH: no cervical, supraclavicular, axillary, or inguinal adenopathy    Diagnostic Test Results:  Labs reviewed in Epic    ASSESSMENT/PLAN:       ICD-10-CM    1. Routine general medical examination at a health care facility  Z00.00       2. Screening for prostate cancer  Z12.5 PSA, screen      3. Encounter for screening examination for impaired glucose regulation and diabetes mellitus  Z13.1 Glucose      4. Gastroesophageal reflux disease without esophagitis  K21.9       5. Mixed hyperlipidemia  E78.2 Lipid panel reflex to direct LDL Fasting      6. Subcutaneous nodule  R22.9 Adult Dermatology  Referral          He is interested in skin check by derm due to moles; also will monitor nodules.  GERD: well controlled, does not want to stop pepcid as his heartburn recurs. Reviewed EGD  Reviewed labwork.      COUNSELING:  Reviewed preventive health counseling, as reflected in patient instructions       Regular exercise       Healthy  diet/nutrition       Immunizations  Vaccinated for: Zoster           The 10-year ASCVD risk score (Kelly CALLOWAY, et al., 2019) is: 3.1%    Values used to calculate the score:      Age: 48 years      Sex: Male      Is Non- : No      Diabetic: No      Tobacco smoker: No      Systolic Blood Pressure: 116 mmHg      Is BP treated: No      HDL Cholesterol: 56 mg/dL      Total Cholesterol: 228 mg/dL        He reports that he has never smoked. He has never used smokeless tobacco.        BRANDY Rojas [facility]

## 2024-04-24 ENCOUNTER — OFFICE VISIT (OUTPATIENT)
Dept: DERMATOLOGY | Facility: CLINIC | Age: 49
End: 2024-04-24
Payer: COMMERCIAL

## 2024-04-24 DIAGNOSIS — D22.9 MULTIPLE BENIGN NEVI: ICD-10-CM

## 2024-04-24 DIAGNOSIS — L81.4 LENTIGO: ICD-10-CM

## 2024-04-24 DIAGNOSIS — D17.1 LIPOMA OF ABDOMINAL WALL: ICD-10-CM

## 2024-04-24 DIAGNOSIS — L82.1 SEBORRHEIC KERATOSES: ICD-10-CM

## 2024-04-24 DIAGNOSIS — D18.01 ANGIOMA OF SKIN: ICD-10-CM

## 2024-04-24 DIAGNOSIS — D23.9 DERMAL NEVUS: ICD-10-CM

## 2024-04-24 DIAGNOSIS — D17.21 LIPOMA OF RIGHT FOREARM: Primary | ICD-10-CM

## 2024-04-24 PROCEDURE — 99203 OFFICE O/P NEW LOW 30 MIN: CPT | Performed by: DERMATOLOGY

## 2024-04-24 NOTE — NURSING NOTE
Shamir Shea's chief complaint for this visit includes:  Chief Complaint   Patient presents with    Skin Check     Patient is here for a skin check. Patient has 2 lumps he is concerned about located on right arm and left abdomen.      PCP: Kelsi Romero    Referring Provider:  Kelsi Romero PA-C  [id number] [place] [address]    There were no vitals taken for this visit.  Data Unavailable        Allergies   Allergen Reactions    Nkda [No Known Drug Allergy]          Do you need any medication refills at today's visit? No    Stephany Huertas MA

## 2024-04-24 NOTE — LETTER
4/24/2024         RE: Shamir Shea  [address] [place] [address]        Dear Colleague,    Thank you for referring your patient, Shamir Shea, to the [facility]. Please see a copy of my visit note below.    Shamir Shea , a 48 year old year old male patient, I was asked to see by ANKIT Romero for spots on skin.  .  Patient has no other skin complaints today.  Remainder of the HPI, Meds, PMH, Allergies, FH, and SH was reviewed in chart.      Past Medical History:   Diagnosis Date     FHx: factor V Leiden mutation     Patient has tested negative in the past     PONV (postoperative nausea and vomiting)        Past Surgical History:   Procedure Laterality Date     ARTHROSCOPY KNEE WITH MEDIAL MENISCECTOMY  10/19/2011    Procedure:ARTHROSCOPY KNEE WITH MEDIAL MENISCECTOMY; Right Knee Arthroscopy with Medial Menisectomy--Anes Choice; Surgeon:SUMI BEAN; Location[facility]     AS REPAIR OF BICEPS TENDON AT ELBOW Left      COLONOSCOPY N/A 12/6/2022    Procedure: COLONOSCOPY, FLEXIBLE, WITH LESION REMOVAL USING SNARE;  Surgeon: Rancho Thompson DO;  Location: [facility]     ESOPHAGOSCOPY, GASTROSCOPY, DUODENOSCOPY (EGD), COMBINED N/A 12/6/2022    Procedure: ESOPHAGOGASTRODUODENOSCOPY, WITH BIOPSY;  Surgeon: Rancho Thompson DO;  Location: [facility]     HERNIORRHAPHY HIATAL  1991     LASIK       REPAIR TENDON ACHILLES  11/14/2011    Procedure:REPAIR TENDON ACHILLES; Left ankle achilles tendon rupure repair-popliteal block; Surgeon:KATINA STEIN; Location[facility]        Family History   Problem Relation Age of Onset     Circulatory Mother         DVT     Blood Disease Mother         Factor five      Hypertension Brother      Lipids Brother      C.A.D. Maternal Grandfather      Cancer No family hx of        Social History     Socioeconomic History     Marital status:      Spouse name: Mesha Shea     Number of children: 2     Years of education: 16      Highest education level: Not on file   Occupational History     Occupation: Senior Storage Engineer     Employer: [employer]   Tobacco Use     Smoking status: Never     Smokeless tobacco: Never   Substance and Sexual Activity     Alcohol use: Yes     Comment: 1 drink per month     Drug use: No     Sexual activity: Yes     Partners: Female     Birth control/protection: Male Surgical     Comment: Vastectomy   Other Topics Concern     Parent/sibling w/ CABG, MI or angioplasty before 65F 55M? No   Social History Narrative     Not on file     Social Determinants of Health     Financial Resource Strain: Low Risk  (1/5/2024)    Financial Resource Strain      Within the past 12 months, have you or your family members you live with been unable to get utilities (heat, electricity) when it was really needed?: No   Food Insecurity: Low Risk  (1/5/2024)    Food Insecurity      Within the past 12 months, did you worry that your food would run out before you got money to buy more?: No      Within the past 12 months, did the food you bought just not last and you didn t have money to get more?: No   Transportation Needs: Low Risk  (1/5/2024)    Transportation Needs      Within the past 12 months, has lack of transportation kept you from medical appointments, getting your medicines, non-medical meetings or appointments, work, or from getting things that you need?: No   Physical Activity: Not on file   Stress: Not on file   Social Connections: Not on file   Interpersonal Safety: Not on file   Housing Stability: Low Risk  (1/5/2024)    Housing Stability      Do you have housing? : Yes      Are you worried about losing your housing?: No       Outpatient Encounter Medications as of 4/24/2024   Medication Sig Dispense Refill     Cholecalciferol (VITAMIN D PO)        famotidine (PEPCID) 40 MG tablet Take 1 tablet (40 mg) by mouth daily 90 tablet 0     fish oil-omega-3 fatty acids 1000 MG capsule        multivitamin w/minerals  (THERA-VIT-M) tablet Take 1 tablet by mouth daily       No facility-administered encounter medications on file as of 4/24/2024.             Review Of Systems  Skin: As above  Eyes: negative  Ears/Nose/Throat: negative  Respiratory: No shortness of breath, dyspnea on exertion, cough, or hemoptysis  Cardiovascular: negative  Gastrointestinal: negative  Genitourinary: negative  Musculoskeletal: negative  Neurologic: negative  Psychiatric: negative  Hematologic/Lymphatic/Immunologic: negative  Endocrine: negative      O:   NAD, WDWN, Alert & Oriented, Mood & Affect wnl, Vitals stable   General appearance mago ii   Vitals stable   Alert, oriented and in no acute distress     pigmented macules on trunk and ext with regular borders and pigment networks  R forearm and L ab movable sof tnodules    Stuck on papules and brown macules on trunk and ext    Red papules on trunk   Flesh colored papules on trunk      The remainder of the full exam was normal; the following areas were examined:  conjunctiva/lids, , neck, peripheral vascular system, abdomen, lymph nodes, digits/nails, eccrine and apocrine glands, scalp/hair, face, neck, chest, abdomen, buttocks, back, RUE, LUE, RLE, LLE       Eyes: Conjunctivae/lids:Normal     ENT: Lips, buccal mucosa, tongue: normal    MSK:Normal    Cardiovascular: peripheral edema none    Pulm: Breathing Normal    Lymph Nodes: No Head and Neck Lymphadenopathy     Neuro/Psych: Orientation:Normal; Mood/Affect:Normal      A/P:  1. Seborrheic keratosis, lentigo, angioma, dermal nevus, lipoma, nevi   It was a pleasure speaking to Shamir Shea today.  Previous clinic  notes and pertinent laboratory tests were reviewed prior to Shamir Shea's visit.    Nature and genetics of benign skin lesions dicussed with patient.  Signs and Symptoms of skin cancer discussed with patient.  Patient encouraged to perform monthly skin exams.  UV precautions reviewed with patient.  Return to clinic 12  months      Again, thank you for allowing me to participate in the care of your patient.        Sincerely,        Maico Storey MD

## 2024-04-24 NOTE — PROGRESS NOTES
Shamir Shea , a 48 year old year old male patient, I was asked to see by ANKIT Romero for spots on skin.  .  Patient has no other skin complaints today.  Remainder of the HPI, Meds, PMH, Allergies, FH, and SH was reviewed in chart.      Past Medical History:   Diagnosis Date    FHx: factor V Leiden mutation     Patient has tested negative in the past    PONV (postoperative nausea and vomiting)        Past Surgical History:   Procedure Laterality Date    ARTHROSCOPY KNEE WITH MEDIAL MENISCECTOMY  10/19/2011    Procedure:ARTHROSCOPY KNEE WITH MEDIAL MENISCECTOMY; Right Knee Arthroscopy with Medial Menisectomy--Anes Choice; Surgeon:SUMI BEAN; Location[facility]    AS REPAIR OF BICEPS TENDON AT ELBOW Left     COLONOSCOPY N/A 12/6/2022    Procedure: COLONOSCOPY, FLEXIBLE, WITH LESION REMOVAL USING SNARE;  Surgeon: Rancho Thompson DO;  Location: [facility]    ESOPHAGOSCOPY, GASTROSCOPY, DUODENOSCOPY (EGD), COMBINED N/A 12/6/2022    Procedure: ESOPHAGOGASTRODUODENOSCOPY, WITH BIOPSY;  Surgeon: Rancho Thompson DO;  Location: [facility]    HERNIORRHAPHY HIATAL  1991    LASIK      REPAIR TENDON ACHILLES  11/14/2011    Procedure:REPAIR TENDON ACHILLES; Left ankle achilles tendon rupure repair-popliteal block; Surgeon:KATINA STEIN; Location:[facility]        Family History   Problem Relation Age of Onset    Circulatory Mother         DVT    Blood Disease Mother         Factor five     Hypertension Brother     Lipids Brother     C.A.D. Maternal Grandfather     Cancer No family hx of        Social History     Socioeconomic History    Marital status:      Spouse name: Mesha Shea    Number of children: 2    Years of education: 16    Highest education level: Not on file   Occupational History    Occupation: Senior Storage Engineer     Employer: [employer]   Tobacco Use    Smoking status: Never    Smokeless tobacco: Never   Substance and Sexual Activity    Alcohol use: Yes     Comment: 1 drink per  month    Drug use: No    Sexual activity: Yes     Partners: Female     Birth control/protection: Male Surgical     Comment: Vastectomy   Other Topics Concern    Parent/sibling w/ CABG, MI or angioplasty before 65F 55M? No   Social History Narrative    Not on file     Social Determinants of Health     Financial Resource Strain: Low Risk  (1/5/2024)    Financial Resource Strain     Within the past 12 months, have you or your family members you live with been unable to get utilities (heat, electricity) when it was really needed?: No   Food Insecurity: Low Risk  (1/5/2024)    Food Insecurity     Within the past 12 months, did you worry that your food would run out before you got money to buy more?: No     Within the past 12 months, did the food you bought just not last and you didn t have money to get more?: No   Transportation Needs: Low Risk  (1/5/2024)    Transportation Needs     Within the past 12 months, has lack of transportation kept you from medical appointments, getting your medicines, non-medical meetings or appointments, work, or from getting things that you need?: No   Physical Activity: Not on file   Stress: Not on file   Social Connections: Not on file   Interpersonal Safety: Not on file   Housing Stability: Low Risk  (1/5/2024)    Housing Stability     Do you have housing? : Yes     Are you worried about losing your housing?: No       Outpatient Encounter Medications as of 4/24/2024   Medication Sig Dispense Refill    Cholecalciferol (VITAMIN D PO)       famotidine (PEPCID) 40 MG tablet Take 1 tablet (40 mg) by mouth daily 90 tablet 0    fish oil-omega-3 fatty acids 1000 MG capsule       multivitamin w/minerals (THERA-VIT-M) tablet Take 1 tablet by mouth daily       No facility-administered encounter medications on file as of 4/24/2024.             Review Of Systems  Skin: As above  Eyes: negative  Ears/Nose/Throat: negative  Respiratory: No shortness of breath, dyspnea on exertion, cough, or  hemoptysis  Cardiovascular: negative  Gastrointestinal: negative  Genitourinary: negative  Musculoskeletal: negative  Neurologic: negative  Psychiatric: negative  Hematologic/Lymphatic/Immunologic: negative  Endocrine: negative      O:   NAD, WDWN, Alert & Oriented, Mood & Affect wnl, Vitals stable   General appearance mago ii   Vitals stable   Alert, oriented and in no acute distress     pigmented macules on trunk and ext with regular borders and pigment networks  R forearm and L ab movable sof tnodules    Stuck on papules and brown macules on trunk and ext    Red papules on trunk   Flesh colored papules on trunk      The remainder of the full exam was normal; the following areas were examined:  conjunctiva/lids, , neck, peripheral vascular system, abdomen, lymph nodes, digits/nails, eccrine and apocrine glands, scalp/hair, face, neck, chest, abdomen, buttocks, back, RUE, LUE, RLE, LLE       Eyes: Conjunctivae/lids:Normal     ENT: Lips, buccal mucosa, tongue: normal    MSK:Normal    Cardiovascular: peripheral edema none    Pulm: Breathing Normal    Lymph Nodes: No Head and Neck Lymphadenopathy     Neuro/Psych: Orientation:Normal; Mood/Affect:Normal      A/P:  1. Seborrheic keratosis, lentigo, angioma, dermal nevus, lipoma, nevi   It was a pleasure speaking to Shamir Shea today.  Previous clinic  notes and pertinent laboratory tests were reviewed prior to Shamir Shea's visit.    Nature and genetics of benign skin lesions dicussed with patient.  Signs and Symptoms of skin cancer discussed with patient.  Patient encouraged to perform monthly skin exams.  UV precautions reviewed with patient.  Return to clinic 12 months

## 2024-07-12 ENCOUNTER — MYC MEDICAL ADVICE (OUTPATIENT)
Dept: FAMILY MEDICINE | Facility: CLINIC | Age: 49
End: 2024-07-12
Payer: COMMERCIAL

## 2024-07-12 DIAGNOSIS — U07.1 INFECTION DUE TO 2019 NOVEL CORONAVIRUS: Primary | ICD-10-CM

## 2024-07-12 NOTE — TELEPHONE ENCOUNTER
RN COVID TREATMENT VISIT  07/12/24      The patient has been triaged and does not require a higher level of care.    Shamir Shea  49 year old  Current weight? 150lbs    Has the patient been seen by a primary care provider at an [facility] or [facility] within the past two years? Yes.   Have you been in close proximity to/do you have a known exposure to a person with a confirmed case of influenza? No.     General treatment eligibility:  Date of positive COVID test (PCR or at home)?  7/11/2024    Are you or have you been hospitalized for this COVID-19 infection? No.   Have you received monoclonal antibodies or antiviral treatment for COVID-19 since this positive test? No.   Do you have any of the following conditions that place you at risk of being very sick from COVID-19?   - Age 50 years or older  Yes, patient has at least one high risk condition as noted above.     Current COVID symptoms:   - fatigue  - muscle or body aches  - headache  - sore throat  - congestion or runny nose  Yes. Patient has at least one symptom as selected.     How many days since symptoms started? 5 days or less. Established patient, 12 years or older weighing at least 88.2 lbs, who has symptoms that started in the past 5 days, has not been hospitalized nor received treatment already, and is at risk for being very sick from COVID-19.     Treatment eligibility by RN:  Are you currently pregnant or nursing? No  Do you have a clinically significant hypersensitivity to nirmatrelvir or ritonavir, or toxic epidermal necrolysis (TEN) or Dawson-Paulie Syndrome? No  Do you have a history of hepatitis, any hepatic impairment on the Problem List (such as Child-Schneider Class C, cirrhosis, fatty liver disease, alcoholic liver disease), or was the last liver lab (hepatic panel, ALT, AST, ALK Phos, bilirubin) elevated in the past 6 months? No  Do you have any history of severe renal impairment (eGFR < 30mL/min)? No    Is  patient eligible to continue? Yes, patient meets all eligibility requirements for the RN COVID treatment (as denoted by all no responses above).     Current Outpatient Medications   Medication Sig Dispense Refill    Cholecalciferol (VITAMIN D PO)       famotidine (PEPCID) 40 MG tablet Take 1 tablet (40 mg) by mouth daily 90 tablet 0    fish oil-omega-3 fatty acids 1000 MG capsule       multivitamin w/minerals (THERA-VIT-M) tablet Take 1 tablet by mouth daily         Medications from List 1 of the standing order (on medications that exclude the use of Paxlovid) that patient is taking: NONE. Is patient taking [place]'s Wort? No  Is patient taking [place]'s Wort or any meds from List 1? No.   Medications from List 2 of the standing order (on meds that provider needs to adjust) that patient is taking: NONE. Is patient on any of the meds from List 2? No.   Medications from List 3 of standing order (on meds that a RN needs to adjust) that patient is taking: NONE. Is patient on any meds from List 3? No.     Paxlovid has an approximate 90% reduction in hospitalization. Paxlovid can possibly cause altered sense of taste, diarrhea (loose, watery stools), high blood pressure, muscle aches.     Would patient like a Paxlovid prescription?   Yes.   Lab Results   Component Value Date    GFRESTIMATED 87 06/20/2023       Was last eGFR reduced? No, eGFR 60 or greater/ No Result on record. Patient can receive the normal renal function dose. Paxlovid Rx sent to [place] pharmacy   [facility]     Temporary change to home medications: None    All medication adjustments (holds, etc) were discussed with the patient and patient was asked to repeat back (teachback) their med adjustment.  Did patient understand med adjustment? No medication adjustments needed.         Reviewed the following instructions with the patient:    Paxlovid (nimatrelvir and ritonavir)    How it works  Two medicines (nirmatrelvir and ritonavir) are taken  together. They stop the virus from growing. Less amount of virus is easier for your body to fight.    How to take  Medicine comes in a daily container with both medicine tablets. Take by mouth twice daily (once in the morning, once at night) for 5 days.  The number of tablets to take varies by patient.  Don't chew or break capsules. Swallow whole.    When to take  Take as soon as possible after positive COVID-19 test result, and within 5 days of your first symptoms.    Possible side effects  Can cause altered sense of taste, diarrhea (loose, watery stools), high blood pressure, muscle aches.      Paxlovid is no longer free from the government. There are financial assistance programs available. You can learn more at https://paxlovid.[employer]/ or 1-416.416.2259, press 2 for patient options. Please look into this before you go to the pharmacy to  your medicine.      Basilio Escobar RN

## 2025-03-02 ENCOUNTER — HEALTH MAINTENANCE LETTER (OUTPATIENT)
Age: 50
End: 2025-03-02

## (undated) DEVICE — ENDO SNARE EXACTO COLD 9MM LOOP 2.4MMX230CM 00711115

## (undated) DEVICE — ENDO FORCEP ENDOJAW BIOPSY 2.8MMX230CM FB-220U

## (undated) RX ORDER — GLYCOPYRROLATE 0.2 MG/ML
INJECTION, SOLUTION INTRAMUSCULAR; INTRAVENOUS
Status: DISPENSED
Start: 2022-12-06

## (undated) RX ORDER — PROPOFOL 10 MG/ML
INJECTION, EMULSION INTRAVENOUS
Status: DISPENSED
Start: 2022-12-06

## (undated) RX ORDER — LIDOCAINE HYDROCHLORIDE 10 MG/ML
INJECTION, SOLUTION EPIDURAL; INFILTRATION; INTRACAUDAL; PERINEURAL
Status: DISPENSED
Start: 2022-12-06